# Patient Record
Sex: MALE | Race: WHITE | NOT HISPANIC OR LATINO | Employment: FULL TIME | URBAN - METROPOLITAN AREA
[De-identification: names, ages, dates, MRNs, and addresses within clinical notes are randomized per-mention and may not be internally consistent; named-entity substitution may affect disease eponyms.]

---

## 2019-01-22 ENCOUNTER — OFFICE VISIT (OUTPATIENT)
Dept: GASTROENTEROLOGY | Facility: CLINIC | Age: 64
End: 2019-01-22

## 2019-01-22 ENCOUNTER — LAB (OUTPATIENT)
Dept: LAB | Facility: HOSPITAL | Age: 64
End: 2019-01-22

## 2019-01-22 VITALS — HEART RATE: 65 BPM | SYSTOLIC BLOOD PRESSURE: 160 MMHG | DIASTOLIC BLOOD PRESSURE: 98 MMHG | WEIGHT: 209.2 LBS

## 2019-01-22 DIAGNOSIS — K74.3 PRIMARY BILIARY CIRRHOSIS (HCC): ICD-10-CM

## 2019-01-22 DIAGNOSIS — K74.3 PRIMARY BILIARY CIRRHOSIS (HCC): Primary | ICD-10-CM

## 2019-01-22 DIAGNOSIS — K20.0 ESOPHAGITIS, EOSINOPHILIC: ICD-10-CM

## 2019-01-22 DIAGNOSIS — R31.9 HEMATURIA, UNSPECIFIED TYPE: ICD-10-CM

## 2019-01-22 LAB
ALBUMIN SERPL-MCNC: 4.41 G/DL (ref 3.2–4.8)
ALBUMIN/GLOB SERPL: 1.6 G/DL (ref 1.5–2.5)
ALP SERPL-CCNC: 450 U/L (ref 25–100)
ALT SERPL W P-5'-P-CCNC: 252 U/L (ref 7–40)
AMMONIA BLD-SCNC: 50 UMOL/L (ref 19–60)
ANION GAP SERPL CALCULATED.3IONS-SCNC: 11 MMOL/L (ref 3–11)
AST SERPL-CCNC: 140 U/L (ref 0–33)
BASOPHILS # BLD AUTO: 0.05 10*3/MM3 (ref 0–0.2)
BASOPHILS NFR BLD AUTO: 0.6 % (ref 0–1)
BILIRUB SERPL-MCNC: 5 MG/DL (ref 0.3–1.2)
BUN BLD-MCNC: 17 MG/DL (ref 9–23)
BUN/CREAT SERPL: 13.3 (ref 7–25)
CALCIUM SPEC-SCNC: 9.9 MG/DL (ref 8.7–10.4)
CHLORIDE SERPL-SCNC: 103 MMOL/L (ref 99–109)
CO2 SERPL-SCNC: 27 MMOL/L (ref 20–31)
CREAT BLD-MCNC: 1.28 MG/DL (ref 0.6–1.3)
DEPRECATED RDW RBC AUTO: 46.5 FL (ref 37–54)
EOSINOPHIL # BLD AUTO: 0.79 10*3/MM3 (ref 0–0.3)
EOSINOPHIL NFR BLD AUTO: 10.1 % (ref 0–3)
ERYTHROCYTE [DISTWIDTH] IN BLOOD BY AUTOMATED COUNT: 14.7 % (ref 11.3–14.5)
GFR SERPL CREATININE-BSD FRML MDRD: 56 ML/MIN/1.73
GLOBULIN UR ELPH-MCNC: 2.7 GM/DL
GLUCOSE BLD-MCNC: 87 MG/DL (ref 70–100)
HCT VFR BLD AUTO: 50.8 % (ref 38.9–50.9)
HGB BLD-MCNC: 16.5 G/DL (ref 13.1–17.5)
IMM GRANULOCYTES # BLD AUTO: 0.04 10*3/MM3 (ref 0–0.03)
IMM GRANULOCYTES NFR BLD AUTO: 0.5 % (ref 0–0.6)
INR PPP: 0.9 (ref 0.85–1.16)
LYMPHOCYTES # BLD AUTO: 1.64 10*3/MM3 (ref 0.6–4.8)
LYMPHOCYTES NFR BLD AUTO: 21 % (ref 24–44)
MCH RBC QN AUTO: 28.1 PG (ref 27–31)
MCHC RBC AUTO-ENTMCNC: 32.5 G/DL (ref 32–36)
MCV RBC AUTO: 86.5 FL (ref 80–99)
MONOCYTES # BLD AUTO: 0.74 10*3/MM3 (ref 0–1)
MONOCYTES NFR BLD AUTO: 9.5 % (ref 0–12)
NEUTROPHILS # BLD AUTO: 4.59 10*3/MM3 (ref 1.5–8.3)
NEUTROPHILS NFR BLD AUTO: 58.8 % (ref 41–71)
PLATELET # BLD AUTO: 191 10*3/MM3 (ref 150–450)
PMV BLD AUTO: 13.1 FL (ref 6–12)
POTASSIUM BLD-SCNC: 4.4 MMOL/L (ref 3.5–5.5)
PROT SERPL-MCNC: 7.1 G/DL (ref 5.7–8.2)
PROTHROMBIN TIME: 11.7 SECONDS (ref 11.2–14.3)
RBC # BLD AUTO: 5.87 10*6/MM3 (ref 4.2–5.76)
SODIUM BLD-SCNC: 141 MMOL/L (ref 132–146)
WBC NRBC COR # BLD: 7.81 10*3/MM3 (ref 3.5–10.8)

## 2019-01-22 PROCEDURE — 85025 COMPLETE CBC W/AUTO DIFF WBC: CPT

## 2019-01-22 PROCEDURE — 36415 COLL VENOUS BLD VENIPUNCTURE: CPT

## 2019-01-22 PROCEDURE — 82140 ASSAY OF AMMONIA: CPT

## 2019-01-22 PROCEDURE — 80053 COMPREHEN METABOLIC PANEL: CPT

## 2019-01-22 PROCEDURE — 85610 PROTHROMBIN TIME: CPT

## 2019-01-22 PROCEDURE — 99214 OFFICE O/P EST MOD 30 MIN: CPT | Performed by: INTERNAL MEDICINE

## 2019-01-22 RX ORDER — URSODIOL 500 MG/1
1500 TABLET, FILM COATED ORAL DAILY
Refills: 0 | COMMUNITY
Start: 2019-01-12

## 2019-01-22 RX ORDER — OMEPRAZOLE 40 MG/1
40 CAPSULE, DELAYED RELEASE ORAL DAILY
Refills: 0 | COMMUNITY
Start: 2019-01-12

## 2019-01-22 RX ORDER — LISINOPRIL 20 MG/1
20 TABLET ORAL DAILY
Refills: 0 | COMMUNITY
Start: 2018-12-16

## 2019-01-22 NOTE — PROGRESS NOTES
GASTROENTEROLOGY OFFICE NOTE  David Garrido  [id number]  1955    CARE TEAM  Patient Care Team:  Provider, No Known as PCP - General    No ref. provider found     Chief Complaint   Patient presents with   • Follow-up     Yearly follow up for Primary biliary cirrhosis and also noted that he recently  has had a change in appetite and blood in urine         HISTORY OF PRESENT ILLNESS:  Patient known to me with primary biliary cirrhosis with F2/A0 staging by fibrosure testing.  He is on ursodeoxycholic acid and presents with complaints of office appetite, early satiety as well as recently noticing some hematuria.    He has not had any problems with dark urine, itching, fevers, chills    He denies dysphagia, odynophagia, early satiety, unexplained weight loss, melanotic stools, constipation or diarrhea.    Last colonoscopy was about 2-3 years ago and was reportedly within normal limits.    He also has a history of eosinophilic esophagitis which has required esophageal dilation in past.  This is currently not causing him any problems.  He remains on omeprazole and denies as stated above any dysphagia or retrosternal discomfort or any dyspeptic symptoms.      PAST MEDICAL HISTORY  Past Medical History:   Diagnosis Date   • Eosinophilic esophagitis    • Primary biliary cirrhosis (CMS/HCC)         PAST SURGICAL HISTORY  Last colonoscopy was roughly 2-3 years ago and was reportedly within normal limits.    Past Surgical History:   Procedure Laterality Date   • COLONOSCOPY     • ENDOSCOPY          MEDICATIONS:    Current Outpatient Medications:   •  lisinopril (PRINIVIL,ZESTRIL) 20 MG tablet, , Disp: , Rfl: 0  •  omeprazole (priLOSEC) 40 MG capsule, , Disp: , Rfl: 0  •  PROAIR  (90 Base) MCG/ACT inhaler, , Disp: , Rfl:   •  ursodiol (ACTIGALL) 500 MG tablet, , Disp: , Rfl: 0    ALLERGIES  No Known Allergies    FAMILY HISTORY:  Family History   Problem Relation Age of Onset   • No Known Problems Mother    • No Known  Problems Father        SOCIAL HISTORY  Social History     Socioeconomic History   • Marital status:      Spouse name: Not on file   • Number of children: Not on file   • Years of education: Not on file   • Highest education level: Not on file   Tobacco Use   • Smoking status: Never Smoker   • Smokeless tobacco: Never Used   Substance and Sexual Activity   • Alcohol use: Yes     Comment: rarely   • Drug use: No   • Sexual activity: Defer     Socioeconomic history   with 3 children and 6 grandchildren.         REVIEW OF SYSTEMS  Review of Systems   Constitutional: Negative for unexpected weight loss.   HENT: Negative for trouble swallowing.    Eyes: Negative.    Respiratory: Negative.    Gastrointestinal: Positive for indigestion. Negative for abdominal distention, abdominal pain, anal bleeding, blood in stool, constipation, diarrhea, nausea, rectal pain, vomiting and GERD.   Endocrine: Negative.    Genitourinary: Negative.    Musculoskeletal: Negative.    Skin: Negative.    Allergic/Immunologic: Negative.    Neurological: Negative.    Hematological: Negative.    Psychiatric/Behavioral: Negative.        PHYSICAL EXAM   /98 (BP Location: Right arm, Patient Position: Sitting, Cuff Size: Adult)   Pulse 65   Wt 94.9 kg (209 lb 3.2 oz)   General: Alert and oriented x 3. In no apparent or acute distress.  and No stigmata of chronic liver disease  HEENT: Anicteric slcera. Normal oropharynx  Neck: Supple. Without lymphadenopathy  CV: Regular rate and rhythm, S1, S2  Lungs: Clear to ausculation. Without rales, robchi and wheezing  Abdomen:  Soft,non-distended without palpable masses or hepatosplenomeagaly, areas of rebound tenderness or guarding.   Extremeties: without clubbing, cyanosis or edema  Neurologic:  Alert and oriented x 3 without focal motor or sensory deficits  Rectal exam: deferred     No results found for this or any previous visit.     Results Review:  I reviewed the patient's new clinical  results.      ASSESSMENT  1.-Primary biliary cirrhosis.  Stable.  2.-Early satiety.  Rule out upper GI neoplasia/gastroparesis etc.  3.-Recent change in appetite etiology unclear  4.-Recent hematuria.    PLAN  1.-Schedule EGD to exclude esophageal varices and to evaluate his recent onset early satiety and dyspepsia.  2.-Hepatic elastography  3.-CMP, CBC, prothrombin time, ammonia level  4.-Patient will follow-up with his primary care physician for urology referral regarding hematuria       I discussed the patients findings and my recommendations with patient    Portillo Willard Matos MD  1/22/2019   2:35 PM       ADDENDUM:   1.- CAT scan of 1/25/19 revealed liver with multiple calcified granulomas and a mildly heterogeneous attenuation of the liver with some scalloping of the liver capsule inferiorly.  More importantly cholelithiasis is noted.  Pancreas, adrenal glands and biliary system were normal.  Incidental bilateral renal cystic lesions were noted there is a 7 mm stone in the distal right ureter.  Atherosclerosis of the aorta was noted.  Normal preaortic and left renal vein and IVC were noted.  Duodenal abnormalities.  Appendix and terminal ileum were normal and there is an umbilical hernia noted with a small amount of fat noted.  There is no evidence of pancreatic or biliary ductal dilation noted on the interpretation.    Ultrasound is pending.  We'll consider MRCP to rule out choledocholithiasis if ultrasound is nondiagnostic.    EGD of 1/29/19 was unremarkable and negative specifically for esophageal varices, gastric varices or portal hypertensive gastropathy.     Much of this note is an electronic transcription of spoken language to printed text. Electronic transcription of spoken language may permit erroneous, nonsensical word phrases to be inadvertently transcribed.  Although I have reviewed the note for these errors, some may still be present.

## 2019-01-29 ENCOUNTER — LAB REQUISITION (OUTPATIENT)
Dept: LAB | Facility: HOSPITAL | Age: 64
End: 2019-01-29

## 2019-01-29 ENCOUNTER — OUTSIDE FACILITY SERVICE (OUTPATIENT)
Dept: GASTROENTEROLOGY | Facility: CLINIC | Age: 64
End: 2019-01-29

## 2019-01-29 DIAGNOSIS — K74.3 PRIMARY BILIARY CHOLANGITIS (HCC): ICD-10-CM

## 2019-01-29 PROCEDURE — 43239 EGD BIOPSY SINGLE/MULTIPLE: CPT | Performed by: INTERNAL MEDICINE

## 2019-01-29 PROCEDURE — 88305 TISSUE EXAM BY PATHOLOGIST: CPT | Performed by: INTERNAL MEDICINE

## 2019-01-30 LAB
CYTO UR: NORMAL
LAB AP CASE REPORT: NORMAL
LAB AP CLINICAL INFORMATION: NORMAL
PATH REPORT.FINAL DX SPEC: NORMAL
PATH REPORT.GROSS SPEC: NORMAL

## 2019-02-05 DIAGNOSIS — R11.0 NAUSEA: ICD-10-CM

## 2019-02-05 DIAGNOSIS — R79.89 ELEVATED LFTS: ICD-10-CM

## 2019-02-05 DIAGNOSIS — R79.89 ABNORMAL LFTS: ICD-10-CM

## 2019-02-05 DIAGNOSIS — K74.3 PRIMARY BILIARY CIRRHOSIS (HCC): ICD-10-CM

## 2019-02-05 DIAGNOSIS — R31.9 HEMATURIA, UNSPECIFIED TYPE: Primary | ICD-10-CM

## 2019-02-05 DIAGNOSIS — R63.0 LOSS OF APPETITE: ICD-10-CM

## 2019-02-12 ENCOUNTER — HOSPITAL ENCOUNTER (OUTPATIENT)
Dept: MRI IMAGING | Facility: HOSPITAL | Age: 64
Discharge: HOME | End: 2019-02-12
Admitting: INTERNAL MEDICINE

## 2019-02-12 ENCOUNTER — APPOINTMENT (OUTPATIENT)
Dept: ULTRASOUND IMAGING | Facility: HOSPITAL | Age: 64
End: 2019-02-12
Attending: INTERNAL MEDICINE

## 2019-02-12 DIAGNOSIS — R79.89 ELEVATED LFTS: ICD-10-CM

## 2019-02-12 DIAGNOSIS — R79.89 ABNORMAL LFTS: ICD-10-CM

## 2019-02-12 DIAGNOSIS — R63.0 LOSS OF APPETITE: ICD-10-CM

## 2019-02-12 DIAGNOSIS — R11.0 NAUSEA: ICD-10-CM

## 2019-02-12 DIAGNOSIS — R31.9 HEMATURIA, UNSPECIFIED TYPE: ICD-10-CM

## 2019-02-12 DIAGNOSIS — K74.3 PRIMARY BILIARY CIRRHOSIS (HCC): ICD-10-CM

## 2019-02-12 PROCEDURE — 74181 MRI ABDOMEN W/O CONTRAST: CPT

## 2019-02-19 ENCOUNTER — HOSPITAL ENCOUNTER (OUTPATIENT)
Dept: ULTRASOUND IMAGING | Facility: HOSPITAL | Age: 64
Discharge: HOME | End: 2019-02-19
Attending: INTERNAL MEDICINE | Admitting: INTERNAL MEDICINE

## 2019-02-19 DIAGNOSIS — K74.3 PRIMARY BILIARY CIRRHOSIS (HCC): ICD-10-CM

## 2019-02-19 PROCEDURE — 76981 USE PARENCHYMA: CPT

## 2019-02-19 PROCEDURE — 76705 ECHO EXAM OF ABDOMEN: CPT

## 2019-02-28 ENCOUNTER — OFFICE VISIT (OUTPATIENT)
Dept: GASTROENTEROLOGY | Facility: CLINIC | Age: 64
End: 2019-02-28

## 2019-02-28 ENCOUNTER — LAB (OUTPATIENT)
Dept: LAB | Facility: HOSPITAL | Age: 64
End: 2019-02-28

## 2019-02-28 VITALS — WEIGHT: 205.8 LBS | BODY MASS INDEX: 27.91 KG/M2

## 2019-02-28 DIAGNOSIS — L29.9 PRURITUS: ICD-10-CM

## 2019-02-28 DIAGNOSIS — R74.8 ELEVATED LIVER ENZYMES: ICD-10-CM

## 2019-02-28 DIAGNOSIS — K74.3 PRIMARY BILIARY CIRRHOSIS (HCC): Primary | ICD-10-CM

## 2019-02-28 DIAGNOSIS — K74.3 PRIMARY BILIARY CIRRHOSIS (HCC): ICD-10-CM

## 2019-02-28 DIAGNOSIS — T50.905A DRUG-INDUCED HEPATOTOXICITY: ICD-10-CM

## 2019-02-28 DIAGNOSIS — K71.6 DRUG-INDUCED HEPATOTOXICITY: ICD-10-CM

## 2019-02-28 LAB
ALBUMIN SERPL-MCNC: 3.95 G/DL (ref 3.2–4.8)
ALP SERPL-CCNC: 231 U/L (ref 25–100)
ALT SERPL W P-5'-P-CCNC: 47 U/L (ref 7–40)
AST SERPL-CCNC: 41 U/L (ref 0–33)
BILIRUB CONJ SERPL-MCNC: 1 MG/DL (ref 0–0.2)
BILIRUB INDIRECT SERPL-MCNC: 1.2 MG/DL (ref 0.1–1.1)
BILIRUB SERPL-MCNC: 2.2 MG/DL (ref 0.3–1.2)
INR PPP: 0.97 (ref 0.85–1.16)
PROT SERPL-MCNC: 6.3 G/DL (ref 5.7–8.2)
PROTHROMBIN TIME: 12.4 SECONDS (ref 11.2–14.3)

## 2019-02-28 PROCEDURE — 85610 PROTHROMBIN TIME: CPT

## 2019-02-28 PROCEDURE — 36415 COLL VENOUS BLD VENIPUNCTURE: CPT

## 2019-02-28 PROCEDURE — 99214 OFFICE O/P EST MOD 30 MIN: CPT | Performed by: INTERNAL MEDICINE

## 2019-02-28 PROCEDURE — 80076 HEPATIC FUNCTION PANEL: CPT

## 2019-02-28 RX ORDER — HYDROCODONE BITARTRATE AND ACETAMINOPHEN 7.5; 325 MG/1; MG/1
TABLET ORAL
Refills: 0 | COMMUNITY
Start: 2019-02-26 | End: 2021-08-07

## 2019-03-01 PROBLEM — L29.9 PRURITUS: Status: ACTIVE | Noted: 2019-03-01

## 2019-03-01 PROBLEM — T50.905A DRUG-INDUCED HEPATOTOXICITY: Status: ACTIVE | Noted: 2019-03-01

## 2019-03-01 PROBLEM — R74.8 ELEVATED LIVER ENZYMES: Status: ACTIVE | Noted: 2019-03-01

## 2019-03-01 PROBLEM — K71.6 DRUG-INDUCED HEPATOTOXICITY: Status: ACTIVE | Noted: 2019-03-01

## 2019-04-23 ENCOUNTER — OFFICE VISIT (OUTPATIENT)
Dept: GASTROENTEROLOGY | Facility: CLINIC | Age: 64
End: 2019-04-23

## 2019-04-23 VITALS
DIASTOLIC BLOOD PRESSURE: 84 MMHG | WEIGHT: 205.8 LBS | BODY MASS INDEX: 27.87 KG/M2 | SYSTOLIC BLOOD PRESSURE: 169 MMHG | HEIGHT: 72 IN | HEART RATE: 53 BPM

## 2019-04-23 DIAGNOSIS — Z86.010 HISTORY OF ADENOMATOUS POLYP OF COLON: ICD-10-CM

## 2019-04-23 DIAGNOSIS — K20.0 ESOPHAGITIS, EOSINOPHILIC: ICD-10-CM

## 2019-04-23 DIAGNOSIS — R74.8 ELEVATED LIVER ENZYMES: ICD-10-CM

## 2019-04-23 DIAGNOSIS — K74.3 PRIMARY BILIARY CIRRHOSIS (HCC): Primary | ICD-10-CM

## 2019-04-23 PROCEDURE — 99213 OFFICE O/P EST LOW 20 MIN: CPT | Performed by: INTERNAL MEDICINE

## 2019-04-23 RX ORDER — LEVOFLOXACIN 500 MG/1
TABLET, FILM COATED ORAL
Refills: 0 | COMMUNITY
Start: 2019-04-19 | End: 2019-04-24

## 2019-04-23 NOTE — PROGRESS NOTES
GASTROENTEROLOGY OFFICE NOTE  David Garrido Jr.  [id number]  1955    CARE TEAM  Patient Care Team:  Linden Munoz MD as PCP - General (Family Medicine)    No ref. provider found     Chief Complaint   Patient presents with   • Follow-up     Primary Biliary Cirrhosis        HISTORY OF PRESENT ILLNESS:  Patient for follow-up of primary biliary cirrhosis with recent elevation of serum liver enzymes presumed to be due to hepatotoxicity from Levaquin.    Since I last saw him he states he was in the emergency department with what was felt to be some type of skin infection and he was prescribed an antibiotic and he cannot recall if he was prescribed Levaquin specifically.  He may very well have been.  He has emergency department papers that indicate Levaquin as a medication list but it is unclear if this is simply a restatement of his previous meds or new meds that have been recently prescribed.  Cephalexin was also noted.    His most recent serum liver enzymes are from 4/16/2019 at which time his AST was 30, ALT was 28, alkaline phosphatase 195 and total bilirubin 1.1.  These are all marked improvement from previous labs.    His pruritus has resolved and he denies any new localizing GI complaints specifically denies dysphagia, odynophagia, early satiety or excellent weight loss.    He remains asymptomatic from his eosinophilic esophagitis without any swallowing or any chest pain/dyspeptic type symptoms.    PAST MEDICAL HISTORY  Past Medical History:   Diagnosis Date   • Colon polyp    • Eosinophilic esophagitis    • Primary biliary cirrhosis (CMS/HCC)         PAST SURGICAL HISTORY  Past Surgical History:   Procedure Laterality Date   • COLONOSCOPY     • CYSTOSCOPY INSERTION / REMOVAL STENT / STONE     • ENDOSCOPY          MEDICATIONS:    Current Outpatient Medications:   •  HYDROcodone-acetaminophen (NORCO) 7.5-325 MG per tablet, take 1 tablet by mouth every 6 hours if needed for pain, Disp: , Rfl: 0  •   "levoFLOXacin (LEVAQUIN) 500 MG tablet, take 1 tablet by mouth every 24 hours for 14 days, Disp: , Rfl: 0  •  lisinopril (PRINIVIL,ZESTRIL) 20 MG tablet, , Disp: , Rfl: 0  •  Obeticholic Acid (OCALIVA) 5 MG tablet, Take 5 mg by mouth Daily., Disp: 30 tablet, Rfl: 6  •  omeprazole (priLOSEC) 40 MG capsule, , Disp: , Rfl: 0  •  PROAIR  (90 Base) MCG/ACT inhaler, , Disp: , Rfl:   •  ursodiol (ACTIGALL) 500 MG tablet, , Disp: , Rfl: 0    ALLERGIES  No Known Allergies    FAMILY HISTORY:  Family History   Problem Relation Age of Onset   • No Known Problems Mother    • No Known Problems Father        SOCIAL HISTORY  Social History     Socioeconomic History   • Marital status:      Spouse name: Not on file   • Number of children: Not on file   • Years of education: Not on file   • Highest education level: Not on file   Tobacco Use   • Smoking status: Never Smoker   • Smokeless tobacco: Never Used   Substance and Sexual Activity   • Alcohol use: Yes     Comment: rarely   • Drug use: Yes     Types: Marijuana   • Sexual activity: Defer     Socioeconomic History:  He is  and lives at home with his wife.  He is a non-smoker.  Rarely drinks alcoholic beverages.        REVIEW OF SYSTEMS  Review of Systems   Constitutional: Negative for unexpected weight loss.   HENT: Negative for trouble swallowing.    Eyes: Negative.    Respiratory: Negative.    Gastrointestinal: Negative for abdominal distention, abdominal pain, anal bleeding, blood in stool, constipation, diarrhea, nausea, rectal pain, vomiting, GERD and indigestion.   Endocrine: Negative.    Genitourinary: Negative.    Musculoskeletal: Negative.    Skin: Negative.    Allergic/Immunologic: Negative.    Neurological: Negative.    Hematological: Negative.    Psychiatric/Behavioral: Negative.      Above-noted ROS reviewed by me.    PHYSICAL EXAM   /84 (BP Location: Right arm, Patient Position: Sitting, Cuff Size: Adult)   Pulse 53   Ht 182.9 cm (72\")   " Wt 93.4 kg (205 lb 12.8 oz)   BMI 27.91 kg/m²   General: Alert and oriented x 3. In no apparent or acute distress.  and No stigmata of chronic liver disease  HEENT: Anicteric slcera. Normal oropharynx  Neck: Supple. Without lymphadenopathy  CV: Regular rate and rhythm, S1, S2  Lungs: Clear to ausculation. Without rales, robchi and wheezing  Abdomen:  Soft,non-distended without palpable masses or hepatosplenomeagaly, areas of rebound tenderness or guarding.   Extremeties: without clubbing, cyanosis or edema  Neurologic:  Alert and oriented x 3 without focal motor or sensory deficits  Rectal exam: deferred     Results for orders placed or performed in visit on 02/28/19   Protime-INR   Result Value Ref Range    Protime 12.4 11.2 - 14.3 Seconds    INR 0.97 0.85 - 1.16   Hepatic Function Panel   Result Value Ref Range    Total Protein 6.3 5.7 - 8.2 g/dL    Albumin 3.95 3.20 - 4.80 g/dL    ALT (SGPT) 47 (H) 7 - 40 U/L    AST (SGOT) 41 (H) 0 - 33 U/L    Alkaline Phosphatase 231 (H) 25 - 100 U/L    Total Bilirubin 2.2 (H) 0.3 - 1.2 mg/dL    Bilirubin, Direct 1.0 (H) 0.0 - 0.2 mg/dL    Bilirubin, Indirect 1.2 (H) 0.1 - 1.1 mg/dL   Current labs from 4/16/2019 are as follows: Total bilirubin 1.1, alkaline phosphatase 195 units/L, AST 30 units/L, ALT 28 units/L    Results Review:  I reviewed the patient's new clinical results.      ASSESSMENT  1.-Primary biliary cirrhosis  2.-Status post recent elevation of liver enzymes presumed due to Levaquin hepatotoxicity  3.-History of adenomatous colonic polyps  4.-Eosinophilic esophagitis.    PLAN  1.-Continue proton pump inhibitors  2.-Continue Ly  3.-Monitor serum liver enzymes  4.-Patient will call us to confirm whether he is on Levaquin or not.      I discussed the patients findings and my recommendations with patient    Portillo Matos MD  4/23/2019   2:35 PM    Much of this note is an electronic transcription of spoken language to printed text. Electronic  transcription of spoken language may permit erroneous, nonsensical word phrases to be inadvertently transcribed.  Although I have reviewed the note for these errors, some may still be present.

## 2019-04-24 PROBLEM — Z86.0101 HISTORY OF ADENOMATOUS POLYP OF COLON: Status: ACTIVE | Noted: 2019-04-24

## 2019-04-24 PROBLEM — Z86.010 HISTORY OF ADENOMATOUS POLYP OF COLON: Status: ACTIVE | Noted: 2019-04-24

## 2019-06-07 ENCOUNTER — TELEPHONE (OUTPATIENT)
Dept: GASTROENTEROLOGY | Facility: CLINIC | Age: 64
End: 2019-06-07

## 2019-10-22 ENCOUNTER — LAB (OUTPATIENT)
Dept: LAB | Facility: HOSPITAL | Age: 64
End: 2019-10-22

## 2019-10-22 ENCOUNTER — OFFICE VISIT (OUTPATIENT)
Dept: GASTROENTEROLOGY | Facility: CLINIC | Age: 64
End: 2019-10-22

## 2019-10-22 VITALS
SYSTOLIC BLOOD PRESSURE: 151 MMHG | HEART RATE: 63 BPM | BODY MASS INDEX: 27.9 KG/M2 | HEIGHT: 72 IN | WEIGHT: 206 LBS | DIASTOLIC BLOOD PRESSURE: 85 MMHG

## 2019-10-22 DIAGNOSIS — Z86.010 HISTORY OF ADENOMATOUS POLYP OF COLON: ICD-10-CM

## 2019-10-22 DIAGNOSIS — K74.3 PRIMARY BILIARY CIRRHOSIS (HCC): ICD-10-CM

## 2019-10-22 DIAGNOSIS — K71.6 DRUG-INDUCED HEPATOTOXICITY: ICD-10-CM

## 2019-10-22 DIAGNOSIS — T50.905A DRUG-INDUCED HEPATOTOXICITY: ICD-10-CM

## 2019-10-22 DIAGNOSIS — K20.0 ESOPHAGITIS, EOSINOPHILIC: ICD-10-CM

## 2019-10-22 DIAGNOSIS — R74.8 ELEVATED LIVER ENZYMES: ICD-10-CM

## 2019-10-22 DIAGNOSIS — K74.3 PRIMARY BILIARY CIRRHOSIS (HCC): Primary | ICD-10-CM

## 2019-10-22 LAB
ALBUMIN SERPL-MCNC: 3.9 G/DL (ref 3.5–5.2)
ALBUMIN/GLOB SERPL: 1.1 G/DL
ALP SERPL-CCNC: 178 U/L (ref 39–117)
ALT SERPL W P-5'-P-CCNC: 26 U/L (ref 1–41)
AMMONIA BLD-SCNC: 44 UMOL/L (ref 16–60)
ANION GAP SERPL CALCULATED.3IONS-SCNC: 10.5 MMOL/L (ref 5–15)
AST SERPL-CCNC: 25 U/L (ref 1–40)
BILIRUB SERPL-MCNC: 1.5 MG/DL (ref 0.2–1.2)
BUN BLD-MCNC: 18 MG/DL (ref 8–23)
BUN/CREAT SERPL: 15.1 (ref 7–25)
CALCIUM SPEC-SCNC: 9.2 MG/DL (ref 8.6–10.5)
CHLORIDE SERPL-SCNC: 101 MMOL/L (ref 98–107)
CO2 SERPL-SCNC: 26.5 MMOL/L (ref 22–29)
CREAT BLD-MCNC: 1.19 MG/DL (ref 0.76–1.27)
GFR SERPL CREATININE-BSD FRML MDRD: 61 ML/MIN/1.73
GLOBULIN UR ELPH-MCNC: 3.5 GM/DL
GLUCOSE BLD-MCNC: 95 MG/DL (ref 65–99)
INR PPP: 0.98 (ref 0.85–1.16)
POTASSIUM BLD-SCNC: 4.1 MMOL/L (ref 3.5–5.2)
PROT SERPL-MCNC: 7.4 G/DL (ref 6–8.5)
PROTHROMBIN TIME: 12.5 SECONDS (ref 11.2–14.3)
SODIUM BLD-SCNC: 138 MMOL/L (ref 136–145)

## 2019-10-22 PROCEDURE — 82140 ASSAY OF AMMONIA: CPT

## 2019-10-22 PROCEDURE — 80053 COMPREHEN METABOLIC PANEL: CPT

## 2019-10-22 PROCEDURE — 36415 COLL VENOUS BLD VENIPUNCTURE: CPT

## 2019-10-22 PROCEDURE — 99214 OFFICE O/P EST MOD 30 MIN: CPT | Performed by: INTERNAL MEDICINE

## 2019-10-22 PROCEDURE — 85610 PROTHROMBIN TIME: CPT

## 2019-10-22 NOTE — PROGRESS NOTES
"GASTROENTEROLOGY OFFICE NOTE  David Garrido Jr.  [id number]  1955    CARE TEAM  Patient Care Team:  Linden Munoz MD as PCP - General (Family Medicine)    No ref. provider found     Chief Complaint   Patient presents with   • Follow-up     Primary biliary cirrhosis         HISTORY OF PRESENT ILLNESS:  Patient presents for follow-up of primary biliary cirrhosis, recent drug-induced hepatotoxicity and eosinophilic esophagitis.    In terms of his eosinophilic esophagitis he is here today without reflux symptoms dysphagia to solids or dyspeptic symptoms denying odynophagia, early satiety or unexplained weight loss.    In terms of his primary biliary cirrhosis he remains on ursodeoxycholic acid and Ocaliva.  His most recent serum liver enzymes remained mildly elevated.  He denies itching, acholic stools, jaundice, dark urine.    He is noted a little bit of coughing I should note.    He is status post recent Levaquin related to drug-induced liver injury.    Incidentally had a CAT scan done of his chest and he had a \"clogged artery\" but does not have any further details in that regard and apparently will be seeing cardiology as an elective outpatient visit to further address this abnormality.    PAST MEDICAL HISTORY  Past Medical History:   Diagnosis Date   • Colon polyp    • Eosinophilic esophagitis    • Primary biliary cirrhosis (CMS/HCC)         PAST SURGICAL HISTORY  Past Surgical History:   Procedure Laterality Date   • COLONOSCOPY     • CYSTOSCOPY INSERTION / REMOVAL STENT / STONE     • ENDOSCOPY          MEDICATIONS:    Current Outpatient Medications:   •  lisinopril (PRINIVIL,ZESTRIL) 20 MG tablet, , Disp: , Rfl: 0  •  omeprazole (priLOSEC) 40 MG capsule, , Disp: , Rfl: 0  •  ursodiol (ACTIGALL) 500 MG tablet, , Disp: , Rfl: 0  •  HYDROcodone-acetaminophen (NORCO) 7.5-325 MG per tablet, take 1 tablet by mouth every 6 hours if needed for pain, Disp: , Rfl: 0  •  Obeticholic Acid (OCALIVA) 5 MG " tablet, Take 5 mg by mouth Daily., Disp: 30 tablet, Rfl: 6  •  PROAIR  (90 Base) MCG/ACT inhaler, , Disp: , Rfl:     ALLERGIES  Allergies   Allergen Reactions   • Levaquin [Levofloxacin] Other (See Comments)     Elevated liver enzymes, jaundice , intrahepatic cholestasis       FAMILY HISTORY:  Family History   Problem Relation Age of Onset   • No Known Problems Mother    • No Known Problems Father    • Colon polyps Neg Hx    • Colon cancer Neg Hx        SOCIAL HISTORY  Social History     Socioeconomic History   • Marital status:      Spouse name: Not on file   • Number of children: Not on file   • Years of education: Not on file   • Highest education level: Not on file   Tobacco Use   • Smoking status: Never Smoker   • Smokeless tobacco: Never Used   Substance and Sexual Activity   • Alcohol use: Yes     Frequency: Never     Comment: rarely   • Drug use: Yes     Types: Marijuana   • Sexual activity: Yes     Socioeconomic History: He is  and lives at home with his wife.  He is a non-smoker.  Rarely drinks alcoholic beverages.        REVIEW OF SYSTEMS  Review of Systems   Constitutional: Positive for fatigue. Negative for activity change, appetite change, chills, diaphoresis, fever, unexpected weight gain and unexpected weight loss.   HENT: Positive for dental problem. Negative for congestion, drooling, ear discharge, ear pain, facial swelling, hearing loss, mouth sores, nosebleeds, postnasal drip, rhinorrhea, sinus pressure, sneezing, sore throat, swollen glands, tinnitus, trouble swallowing and voice change.    Respiratory: Positive for shortness of breath. Negative for apnea, cough, choking, chest tightness, wheezing and stridor.    Cardiovascular: Negative for chest pain, palpitations and leg swelling.   Gastrointestinal: Negative for abdominal distention, abdominal pain, anal bleeding, blood in stool, constipation, diarrhea, nausea, rectal pain, vomiting, GERD and indigestion.   Endocrine:  "Negative for cold intolerance, heat intolerance, polydipsia, polyphagia and polyuria.   Musculoskeletal: Negative for arthralgias, back pain, gait problem, joint swelling, myalgias, neck pain, neck stiffness and bursitis.   Allergic/Immunologic: Negative for environmental allergies, food allergies and immunocompromised state.   Neurological: Positive for dizziness. Negative for tremors, seizures, syncope, facial asymmetry, speech difficulty, weakness, light-headedness, numbness, headache, memory problem and confusion.   Hematological: Negative for adenopathy. Does not bruise/bleed easily.   Psychiatric/Behavioral: Positive for sleep disturbance. Negative for agitation, behavioral problems, decreased concentration, dysphoric mood, hallucinations, self-injury, suicidal ideas, negative for hyperactivity, depressed mood and stress. The patient is not nervous/anxious.      I have reviewed the above noted review of systems.    PHYSICAL EXAM   /85 (BP Location: Right arm, Patient Position: Sitting, Cuff Size: Adult)   Pulse 63   Ht 182.9 cm (72\")   Wt 93.4 kg (206 lb)   BMI 27.94 kg/m²   General: Alert and oriented x 3. In no apparent or acute distress.  and No stigmata of chronic liver disease  HEENT: Anicteric slcera. Normal oropharynx  Neck: Supple. Without lymphadenopathy  CV: Regular rate and rhythm, S1, S2  Lungs: Clear to ausculation. Without rales, robchi and wheezing  Abdomen:  Soft,non-distended without palpable masses or hepatosplenomeagaly, areas of rebound tenderness or guarding.   Extremeties: without clubbing, cyanosis or edema  Neurologic:  Alert and oriented x 3 without focal motor or sensory deficits  Rectal exam: deferred     Results for orders placed or performed in visit on 02/28/19   Protime-INR   Result Value Ref Range    Protime 12.4 11.2 - 14.3 Seconds    INR 0.97 0.85 - 1.16   Hepatic Function Panel   Result Value Ref Range    Total Protein 6.3 5.7 - 8.2 g/dL    Albumin 3.95 3.20 - 4.80 " g/dL    ALT (SGPT) 47 (H) 7 - 40 U/L    AST (SGOT) 41 (H) 0 - 33 U/L    Alkaline Phosphatase 231 (H) 25 - 100 U/L    Total Bilirubin 2.2 (H) 0.3 - 1.2 mg/dL    Bilirubin, Direct 1.0 (H) 0.0 - 0.2 mg/dL    Bilirubin, Indirect 1.2 (H) 0.1 - 1.1 mg/dL        Results Review:  I reviewed the patient's new clinical results.      ASSESSMENT  1.- PBC.  Stable on ursodeoxycholic acid and obeticholic acid  2.- Elevated LFT's.Multifactorial .   3.- Resolving drug-induced hepatotoxicity  4.- Eosinophilic esophagitis  5.- Abnormal CT. Data deficiti.     PLAN  1.- Reckeck serum liver enzymes  2.- Continue Ocaliva/pedro  3.- RTC 3 months  4.- Continue PPI (omeprazole)  5.- Obtain CT report for review      I discussed the patients findings and my recommendations with patient    Portillo Willard Matos MD  10/22/2019   1:48 PM    Much of this note is an electronic transcription of spoken language to printed text. Electronic transcription of spoken language may permit erroneous, nonsensical word phrases to be inadvertently transcribed.  Although I have reviewed the note for these errors, some may still be present.

## 2019-10-23 NOTE — PROGRESS NOTES
Lucila, let patient know results. Let me know if patient has any concerns.LFT's improving. Continue to monitor mitzi

## 2019-10-24 DIAGNOSIS — K74.3 PRIMARY BILIARY CIRRHOSIS (HCC): Primary | ICD-10-CM

## 2019-10-24 DIAGNOSIS — R79.89 ELEVATED LFTS: ICD-10-CM

## 2019-10-24 DIAGNOSIS — R79.89 ABNORMAL LFTS: ICD-10-CM

## 2019-12-04 DIAGNOSIS — R79.89 ELEVATED LFTS: ICD-10-CM

## 2019-12-04 DIAGNOSIS — K74.3 PRIMARY BILIARY CIRRHOSIS (HCC): Primary | ICD-10-CM

## 2020-01-17 ENCOUNTER — TELEPHONE (OUTPATIENT)
Dept: GASTROENTEROLOGY | Facility: CLINIC | Age: 65
End: 2020-01-17

## 2020-01-17 NOTE — TELEPHONE ENCOUNTER
Called and notified patient about his labs from December and apologized that we were just no calling him. The patient keanu a follow up in December where  was suppose to go over the labs if they were resulted in time. Patient had to cancel that appointment because he had open heart surgery but rescheduled his follow up for   02/04/2020.    Notified patient that his AST and ALT are both normal but the alkaline phosphatase is still elevated as it has been in the past and has gone up since his last labs. Patient confirmed he understood and will talk to Dr. Matos about why his Alkaline phosphatase continues to be elevated during his follow up.

## 2020-02-04 ENCOUNTER — LAB (OUTPATIENT)
Dept: LAB | Facility: HOSPITAL | Age: 65
End: 2020-02-04

## 2020-02-04 ENCOUNTER — OFFICE VISIT (OUTPATIENT)
Dept: GASTROENTEROLOGY | Facility: CLINIC | Age: 65
End: 2020-02-04

## 2020-02-04 VITALS
WEIGHT: 207.4 LBS | HEART RATE: 66 BPM | HEIGHT: 72 IN | DIASTOLIC BLOOD PRESSURE: 71 MMHG | BODY MASS INDEX: 28.09 KG/M2 | SYSTOLIC BLOOD PRESSURE: 128 MMHG

## 2020-02-04 DIAGNOSIS — K74.3 PRIMARY BILIARY CIRRHOSIS (HCC): Primary | ICD-10-CM

## 2020-02-04 DIAGNOSIS — K74.3 PRIMARY BILIARY CIRRHOSIS (HCC): ICD-10-CM

## 2020-02-04 LAB
ALBUMIN SERPL-MCNC: 4 G/DL (ref 3.5–5.2)
ALBUMIN/GLOB SERPL: 1.3 G/DL
ALP SERPL-CCNC: 237 U/L (ref 39–117)
ALT SERPL W P-5'-P-CCNC: 32 U/L (ref 1–41)
ANION GAP SERPL CALCULATED.3IONS-SCNC: 14.9 MMOL/L (ref 5–15)
AST SERPL-CCNC: 31 U/L (ref 1–40)
BILIRUB SERPL-MCNC: 0.8 MG/DL (ref 0.2–1.2)
BUN BLD-MCNC: 19 MG/DL (ref 8–23)
BUN/CREAT SERPL: 13.8 (ref 7–25)
CALCIUM SPEC-SCNC: 9.3 MG/DL (ref 8.6–10.5)
CHLORIDE SERPL-SCNC: 103 MMOL/L (ref 98–107)
CO2 SERPL-SCNC: 25.1 MMOL/L (ref 22–29)
CREAT BLD-MCNC: 1.38 MG/DL (ref 0.76–1.27)
GFR SERPL CREATININE-BSD FRML MDRD: 52 ML/MIN/1.73
GLOBULIN UR ELPH-MCNC: 3.2 GM/DL
GLUCOSE BLD-MCNC: 61 MG/DL (ref 65–99)
INR PPP: 1 (ref 0.85–1.16)
POTASSIUM BLD-SCNC: 4.2 MMOL/L (ref 3.5–5.2)
PROT SERPL-MCNC: 7.2 G/DL (ref 6–8.5)
PROTHROMBIN TIME: 12.7 SECONDS (ref 11.2–14.3)
SODIUM BLD-SCNC: 143 MMOL/L (ref 136–145)

## 2020-02-04 PROCEDURE — 80053 COMPREHEN METABOLIC PANEL: CPT

## 2020-02-04 PROCEDURE — 99213 OFFICE O/P EST LOW 20 MIN: CPT | Performed by: INTERNAL MEDICINE

## 2020-02-04 PROCEDURE — 85610 PROTHROMBIN TIME: CPT

## 2020-02-04 PROCEDURE — 36415 COLL VENOUS BLD VENIPUNCTURE: CPT

## 2020-02-04 RX ORDER — METOPROLOL SUCCINATE 25 MG/1
25 TABLET, EXTENDED RELEASE ORAL DAILY
COMMUNITY
Start: 2019-11-21

## 2020-02-04 RX ORDER — ATORVASTATIN CALCIUM 40 MG/1
40 TABLET, FILM COATED ORAL DAILY
COMMUNITY
Start: 2020-01-23

## 2020-02-04 RX ORDER — CLOPIDOGREL BISULFATE 75 MG/1
75 TABLET ORAL DAILY
COMMUNITY
Start: 2020-01-24

## 2020-02-04 RX ORDER — ISOSORBIDE MONONITRATE 30 MG/1
30 TABLET, EXTENDED RELEASE ORAL DAILY
COMMUNITY
Start: 2019-11-21

## 2020-02-04 NOTE — PROGRESS NOTES
GASTROENTEROLOGY OFFICE NOTE  David Garrido Jr.  [id number]  1955    CARE TEAM  Patient Care Team:  Linden Munoz MD as PCP - General (Family Medicine)    No ref. provider found     Chief Complaint   Patient presents with   • Follow-up     Primary Biliary Cirrhosis        HISTORY OF PRESENT ILLNESS:  Patient presents for follow-up of primary biliary cirrhosis and recent drug-induced liver injury which resolved (Levaquin).    Since I last saw me underwent a stress test because of shortness of breath.  Apparently the stress test was negative and he subsequently underwent cardiac catheterization.  These findings led to a coronary artery bypass grafting surgery with one-vessel apparently the LAD.  Subsequent to his surgery in [place] done by Dr. Ken, he underwent coronary stent placement and is currently on long-term anticoagulants with clopidogrel.    He states he has more energy than he has in a long time is feeling very well and he recovered very well from surgery stating he went back to work 3 weeks postoperatively.    His recent liver enzymes reveal persistently elevated alkaline phosphatase but within his usual range and no elevation of AST, ALT or total bilirubin.    PAST MEDICAL HISTORY  Past Medical History:   Diagnosis Date   • Colon polyp    • Eosinophilic esophagitis    • Primary biliary cirrhosis (CMS/HCC)         PAST SURGICAL HISTORY  Past Surgical History:   Procedure Laterality Date   • CARDIAC SURGERY     • COLONOSCOPY     • CYSTOSCOPY INSERTION / REMOVAL STENT / STONE     • ENDOSCOPY          MEDICATIONS:    Current Outpatient Medications:   •  atorvastatin (LIPITOR) 40 MG tablet, , Disp: , Rfl:   •  clopidogrel (PLAVIX) 75 MG tablet, , Disp: , Rfl:   •  isosorbide mononitrate (IMDUR) 30 MG 24 hr tablet, , Disp: , Rfl:   •  metoprolol succinate XL (TOPROL-XL) 25 MG 24 hr tablet, , Disp: , Rfl:   •  omeprazole (priLOSEC) 40 MG capsule, , Disp: , Rfl: 0  •  ursodiol (ACTIGALL)  500 MG tablet, , Disp: , Rfl: 0  •  HYDROcodone-acetaminophen (NORCO) 7.5-325 MG per tablet, take 1 tablet by mouth every 6 hours if needed for pain, Disp: , Rfl: 0  •  lisinopril (PRINIVIL,ZESTRIL) 20 MG tablet, , Disp: , Rfl: 0  •  Obeticholic Acid (OCALIVA) 5 MG tablet, Take 5 mg by mouth Daily., Disp: 30 tablet, Rfl: 6  •  PROAIR  (90 Base) MCG/ACT inhaler, , Disp: , Rfl:     ALLERGIES  Allergies   Allergen Reactions   • Levaquin [Levofloxacin] Other (See Comments)     Elevated liver enzymes, jaundice , intrahepatic cholestasis       FAMILY HISTORY:  Family History   Problem Relation Age of Onset   • No Known Problems Mother    • No Known Problems Father    • Colon polyps Neg Hx    • Colon cancer Neg Hx        SOCIAL HISTORY  Social History     Socioeconomic History   • Marital status:      Spouse name: Not on file   • Number of children: Not on file   • Years of education: Not on file   • Highest education level: Not on file   Tobacco Use   • Smoking status: Never Smoker   • Smokeless tobacco: Never Used   Substance and Sexual Activity   • Alcohol use: Yes     Frequency: Never     Comment: rarely   • Drug use: Yes     Types: Marijuana   • Sexual activity: Yes     Socioeconomic History:  .  Lives at home with his wife.  Non-smoker/rarely drinks alcoholic beverages.       REVIEW OF SYSTEMS  Review of Systems   Constitutional: Negative for activity change, appetite change, chills, diaphoresis, fatigue, fever, unexpected weight gain and unexpected weight loss.   HENT: Negative for trouble swallowing and voice change.    Gastrointestinal: Negative for abdominal distention, abdominal pain, anal bleeding, blood in stool, constipation, diarrhea, nausea, rectal pain, vomiting, GERD and indigestion.     I reviewed the above-noted review of systems.    PHYSICAL EXAM   There were no vitals taken for this visit.  General: Alert and oriented x 3. In no apparent or acute distress.  and No stigmata of  chronic liver disease  HEENT: Anicteric slcera. Normal oropharynx  Neck: Supple. Without lymphadenopathy  CV: Regular rate and rhythm, S1, S2  Lungs: Clear to ausculation. Without rales, robchi and wheezing  Abdomen:  Soft,non-distended without palpable masses or hepatosplenomeagaly, areas of rebound tenderness or guarding.   Extremeties: without clubbing, cyanosis or edema  Neurologic:  Alert and oriented x 3 without focal motor or sensory deficits  Rectal exam: deferred        Results Review:  I reviewed the patient's new clinical results.  I have his results from January 7, 2020.  Total bilirubin was 1.1  Alkaline phosphatase 308 units/L  AST 21 units/L  ALT 21 units/L  Total protein 6.6 g/dL/albumin 3.7 g/dL  Prothrombin time INR was within normal limits 1.0      ASSESSMENT  1.-  Primary biliary cirrhosis without evidence of decompensation  2.-  Resolved Levaquin induced hepatotoxicity  3.-  Coronary artery disease status post recent CABG and coronary stent placement  4.-  Iatrogenic coagulopathy  5.-  No evidence of esophageal varices on most recent EGD of January 2019    PLAN  1.-  Continue to monitor liver enzymes.  Recheck today and again in 6 months I will see him back in 1 year  2.-  Balance EGD 1 year from now for screening (for esophageal varices  3.-  Continue ursodeoxycholic acid and obeticholic acid  4.-  Follow-up otherwise as needed.      I discussed the patients findings and my recommendations with patient    Portillo Matos MD  2/4/2020   3:02 PM    Much of this note is an electronic transcription of spoken language to printed text. Electronic transcription of spoken language may permit erroneous, nonsensical word phrases to be inadvertently transcribed.  Although I have reviewed the note for these errors, some may still be present.

## 2020-02-05 DIAGNOSIS — K74.3 PRIMARY BILIARY CIRRHOSIS (HCC): Primary | ICD-10-CM

## 2020-02-05 DIAGNOSIS — R79.89 ELEVATED LFTS: ICD-10-CM

## 2020-02-05 DIAGNOSIS — R74.8 ELEVATED LIVER ENZYMES: ICD-10-CM

## 2020-02-05 NOTE — PROGRESS NOTES
Please call the patient regarding his abnormal result. Since Alk phos went up I'd like to check monthly x 3.

## 2021-01-21 ENCOUNTER — OFFICE VISIT (OUTPATIENT)
Dept: GASTROENTEROLOGY | Facility: CLINIC | Age: 66
End: 2021-01-21

## 2021-01-21 VITALS
SYSTOLIC BLOOD PRESSURE: 126 MMHG | WEIGHT: 209.4 LBS | HEART RATE: 54 BPM | DIASTOLIC BLOOD PRESSURE: 58 MMHG | TEMPERATURE: 97.5 F | BODY MASS INDEX: 28.4 KG/M2

## 2021-01-21 DIAGNOSIS — K74.3 PRIMARY BILIARY CIRRHOSIS (HCC): Primary | ICD-10-CM

## 2021-01-21 DIAGNOSIS — K71.6 DRUG-INDUCED HEPATOTOXICITY: ICD-10-CM

## 2021-01-21 DIAGNOSIS — R74.8 ELEVATED LIVER ENZYMES: ICD-10-CM

## 2021-01-21 DIAGNOSIS — K20.0 ESOPHAGITIS, EOSINOPHILIC: ICD-10-CM

## 2021-01-21 DIAGNOSIS — T50.905A DRUG-INDUCED HEPATOTOXICITY: ICD-10-CM

## 2021-01-21 DIAGNOSIS — Z86.010 HISTORY OF ADENOMATOUS POLYP OF COLON: ICD-10-CM

## 2021-01-21 PROCEDURE — 99214 OFFICE O/P EST MOD 30 MIN: CPT | Performed by: INTERNAL MEDICINE

## 2021-01-21 NOTE — PROGRESS NOTES
GASTROENTEROLOGY OFFICE NOTE  David Garrido Jr.  [id number]  1955       CARE TEAM  Patient Care Team:  Linden Munoz MD as PCP - General (Family Medicine)    No ref. provider found     Chief Complaint   Patient presents with   • Follow-up     Primary Biliary cirrhosis         HISTORY OF PRESENT ILLNESS:  Patient presents for routine follow-up of his primary bili cirrhosis with recent liver enzymes largely unremarkable.  He is currently on obeticholic acid and ursodeoxycholic acid.    Labs from December 1, 2020 revealed total bilirubin of 1.3, alkaline phosphatase of 183 units/L and AST and ALT normal at 27 units and 24 units/L.    He is not having any problems with dysphagia, odynophagia, early satiety or unexplained weight loss.    He states he was Covid positive and was mildly symptomatic 3 weeks ago with some fatigue but is recovering and feeling well without any respiratory complaints.        PAST MEDICAL HISTORY  Past Medical History:   Diagnosis Date   • Colon polyp    • Eosinophilic esophagitis    • Primary biliary cirrhosis (CMS/HCC)         PAST SURGICAL HISTORY  Past Surgical History:   Procedure Laterality Date   • CARDIAC SURGERY     • COLONOSCOPY     • CYSTOSCOPY INSERTION / REMOVAL STENT / STONE     • ENDOSCOPY          MEDICATIONS:    Current Outpatient Medications:   •  atorvastatin (LIPITOR) 40 MG tablet, Take 40 mg by mouth Daily., Disp: , Rfl:   •  clopidogrel (PLAVIX) 75 MG tablet, Take 75 mg by mouth Daily., Disp: , Rfl:   •  isosorbide mononitrate (IMDUR) 30 MG 24 hr tablet, Take 30 mg by mouth Daily., Disp: , Rfl:   •  lisinopril (PRINIVIL,ZESTRIL) 20 MG tablet, Take 20 mg by mouth Daily., Disp: , Rfl: 0  •  metoprolol succinate XL (TOPROL-XL) 25 MG 24 hr tablet, Take 25 mg by mouth Daily., Disp: , Rfl:   •  omeprazole (priLOSEC) 40 MG capsule, Take 40 mg by mouth Daily., Disp: , Rfl: 0  •  ursodiol (ACTIGALL) 500 MG tablet, Take 1,500 mg by mouth Daily., Disp: , Rfl: 0  •   HYDROcodone-acetaminophen (NORCO) 7.5-325 MG per tablet, take 1 tablet by mouth every 6 hours if needed for pain, Disp: , Rfl: 0  •  Obeticholic Acid (OCALIVA) 5 MG tablet, Take 5 mg by mouth Daily., Disp: 30 tablet, Rfl: 6  •  PROAIR  (90 Base) MCG/ACT inhaler, , Disp: , Rfl:     ALLERGIES  Allergies   Allergen Reactions   • Levaquin [Levofloxacin] Other (See Comments)     Elevated liver enzymes, jaundice , intrahepatic cholestasis       FAMILY HISTORY:  Family History   Problem Relation Age of Onset   • No Known Problems Mother    • No Known Problems Father    • Colon polyps Neg Hx    • Colon cancer Neg Hx        SOCIAL HISTORY  Social History     Socioeconomic History   • Marital status:      Spouse name: Not on file   • Number of children: Not on file   • Years of education: Not on file   • Highest education level: Not on file   Tobacco Use   • Smoking status: Never Smoker   • Smokeless tobacco: Never Used   Substance and Sexual Activity   • Alcohol use: Yes     Frequency: Never     Comment: rarely   • Drug use: Yes     Types: Marijuana     Comment: Rarely    • Sexual activity: Yes     Socioeconomic History:  .  Lives at home with his wife.  Non-smoker.  Rarely drinks alcohol.  2.       REVIEW OF SYSTEMS  Review of Systems   Constitutional: Negative for activity change, appetite change, chills, diaphoresis, fatigue, fever, unexpected weight gain and unexpected weight loss.   HENT: Negative for trouble swallowing and voice change.    Gastrointestinal: Negative for abdominal distention, abdominal pain, anal bleeding, blood in stool, constipation, diarrhea, nausea, rectal pain, vomiting, GERD and indigestion.     I reviewed the above-noted review of systems    PHYSICAL EXAM   There were no vitals taken for this visit.  General: Alert and oriented x 3. In no apparent or acute distress.  and No stigmata of chronic liver disease  HEENT: Anicteric sclera.  Wearing facemask.  Neck: Supple. Without  lymphadenopathy  CV: Regular rate and rhythm, S1, S2  Lungs: Clear to ausculation. Without rales, rhonchi and wheezing  Abdomen:  Soft,non-distended without palpable masses or hepatosplenomeagaly, areas of rebound tenderness or guarding.   Extremeties: without clubbing, cyanosis or edema  Neurologic:  Alert and oriented x 3 without focal motor or sensory deficits  Rectal exam: deferred      Results Review:  I reviewed the patient's new clinical results.  See HPI      ASSESSMENT  1.-Primary bili cirrhosis  2.-Resolved Levaquin induced hepatotoxicity  3.-Coronary artery disease  4.-Iatrogenic coagulopathy  5.-History of eosinophilic esophagitis asymptomatic on omeprazole 20 mg daily    PLAN  1.-Schedule EGD for assessment of varices  2.-Schedule ultrasound for exclusion of hepatocellular carcinoma and elastography to reassess fibrosis  3.-Continue current obeticholic acid and ursodeoxycholic acid at unchanged dosing  4.-Follow-up appointment in 1 year    Portillo Matos MD  1/21/2021   13:59 EST

## 2021-02-21 ENCOUNTER — APPOINTMENT (OUTPATIENT)
Dept: PREADMISSION TESTING | Facility: HOSPITAL | Age: 66
End: 2021-02-21

## 2021-03-21 ENCOUNTER — APPOINTMENT (OUTPATIENT)
Dept: PREADMISSION TESTING | Facility: HOSPITAL | Age: 66
End: 2021-03-21

## 2021-03-23 ENCOUNTER — OUTSIDE FACILITY SERVICE (OUTPATIENT)
Dept: GASTROENTEROLOGY | Facility: CLINIC | Age: 66
End: 2021-03-23

## 2021-03-23 ENCOUNTER — HOSPITAL ENCOUNTER (OUTPATIENT)
Dept: ULTRASOUND IMAGING | Facility: HOSPITAL | Age: 66
Discharge: HOME | End: 2021-03-23
Admitting: INTERNAL MEDICINE

## 2021-03-23 DIAGNOSIS — K74.3 PRIMARY BILIARY CIRRHOSIS (HCC): ICD-10-CM

## 2021-03-23 PROCEDURE — 43235 EGD DIAGNOSTIC BRUSH WASH: CPT | Performed by: INTERNAL MEDICINE

## 2021-03-23 PROCEDURE — 76700 US EXAM ABDOM COMPLETE: CPT

## 2021-03-25 NOTE — PROGRESS NOTES
Regarding ultrasound of March 23, 2021:No evidence of hepatocellular carcinomaIncidental gallstones noted.  In the absence of symptoms no intervention necessaryKidney cyst noted.  No further evaluation necessary for this.

## 2021-08-03 ENCOUNTER — OFFICE VISIT (OUTPATIENT)
Dept: GASTROENTEROLOGY | Facility: CLINIC | Age: 66
End: 2021-08-03

## 2021-08-03 VITALS
DIASTOLIC BLOOD PRESSURE: 75 MMHG | BODY MASS INDEX: 28.51 KG/M2 | WEIGHT: 210.2 LBS | SYSTOLIC BLOOD PRESSURE: 156 MMHG | TEMPERATURE: 97.7 F | HEART RATE: 53 BPM

## 2021-08-03 DIAGNOSIS — Z86.010 HISTORY OF ADENOMATOUS POLYP OF COLON: ICD-10-CM

## 2021-08-03 DIAGNOSIS — R79.89 ELEVATED LFTS: ICD-10-CM

## 2021-08-03 DIAGNOSIS — T50.905A DRUG-INDUCED HEPATOTOXICITY: ICD-10-CM

## 2021-08-03 DIAGNOSIS — K71.6 DRUG-INDUCED HEPATOTOXICITY: ICD-10-CM

## 2021-08-03 DIAGNOSIS — K74.69 OTHER CIRRHOSIS OF LIVER (HCC): ICD-10-CM

## 2021-08-03 DIAGNOSIS — K20.0 ESOPHAGITIS, EOSINOPHILIC: ICD-10-CM

## 2021-08-03 DIAGNOSIS — K74.3 PRIMARY BILIARY CIRRHOSIS (HCC): Primary | ICD-10-CM

## 2021-08-03 PROCEDURE — 99214 OFFICE O/P EST MOD 30 MIN: CPT | Performed by: INTERNAL MEDICINE

## 2021-08-03 NOTE — PROGRESS NOTES
GASTROENTEROLOGY OFFICE NOTE  David Garrido Jr.      [id number]  1955      Chief Complaint   Patient presents with   • Follow-up   • Primary Biliary Cirrhosis        HISTORY OF PRESENT ILLNESS:  65-year-old white male with primary biliary cirrhosis here today for routine follow-up in the absence of any localizing complaint.  He is up-to-date on his colon cancer screening having had one in 2017 and is up-to-date on his EGDs for variceal surveillance which is not due for another year.  His last EGD was in March 2021 revealed no varices of the stomach or esophagus and no evidence of portal hypertensive gastropathy.    He remains on obeticholic acid as well as ursodeoxycholic acid.    He has asymptomatic gallstones.  He has a history of elevated liver enzymes secondary to drug-induced liver injury which has since resolved.    He denies dysphagia, odynophagia, early satiety, unexplained weight loss, melena or bright red blood per rectum.    His last labs were from February 2, 2021 at which time his AST and ALT were normal at 2519 respectively alkaline phosphatase was only mildly elevated to 181 units/L (high normal 144 units/L) total bilirubin was mildly elevated 1.3 mg/dL (high normal 1.2 mg/dL).  Total protein normal at 6.7 albumin normal 3.9 and globulin is normal 2.8.  I should note that an February 2020 his alkaline phosphatase was 237 and in October 2019 alkaline phosphatase was 178, February 2019 alkaline phosphatase was 231)    He is status post Covid positivity with mild symptoms at the beginning of the year with no residual symptoms or findings.    PAST MEDICAL HISTORY  Past Medical History:   Diagnosis Date   • Colon polyp    • Eosinophilic esophagitis    • Primary biliary cirrhosis (CMS/HCC)         PAST SURGICAL HISTORY  Past Surgical History:   Procedure Laterality Date   • CARDIAC SURGERY     • COLONOSCOPY     • CYSTOSCOPY INSERTION / REMOVAL STENT / STONE     • ENDOSCOPY           MEDICATIONS:    Current Outpatient Medications:   •  atorvastatin (LIPITOR) 40 MG tablet, Take 40 mg by mouth Daily., Disp: , Rfl:   •  clopidogrel (PLAVIX) 75 MG tablet, Take 75 mg by mouth Daily., Disp: , Rfl:   •  isosorbide mononitrate (IMDUR) 30 MG 24 hr tablet, Take 30 mg by mouth Daily., Disp: , Rfl:   •  lisinopril (PRINIVIL,ZESTRIL) 20 MG tablet, Take 20 mg by mouth Daily., Disp: , Rfl: 0  •  metoprolol succinate XL (TOPROL-XL) 25 MG 24 hr tablet, Take 25 mg by mouth Daily., Disp: , Rfl:   •  omeprazole (priLOSEC) 40 MG capsule, Take 40 mg by mouth Daily., Disp: , Rfl: 0  •  ursodiol (ACTIGALL) 500 MG tablet, Take 1,500 mg by mouth Daily., Disp: , Rfl: 0  •  HYDROcodone-acetaminophen (NORCO) 7.5-325 MG per tablet, take 1 tablet by mouth every 6 hours if needed for pain, Disp: , Rfl: 0  •  Obeticholic Acid (OCALIVA) 5 MG tablet, Take 5 mg by mouth Daily., Disp: 30 tablet, Rfl: 6  •  PROAIR  (90 Base) MCG/ACT inhaler, , Disp: , Rfl:     ALLERGIES  Allergies   Allergen Reactions   • Levaquin [Levofloxacin] Other (See Comments)     Elevated liver enzymes, jaundice , intrahepatic cholestasis       FAMILY HISTORY:  Family History   Problem Relation Age of Onset   • No Known Problems Mother    • No Known Problems Father    • Colon polyps Neg Hx    • Colon cancer Neg Hx        SOCIAL HISTORY  Social History     Socioeconomic History   • Marital status:      Spouse name: Not on file   • Number of children: Not on file   • Years of education: Not on file   • Highest education level: Not on file   Tobacco Use   • Smoking status: Never Smoker   • Smokeless tobacco: Never Used   Vaping Use   • Vaping Use: Never used   Substance and Sexual Activity   • Alcohol use: Yes     Comment: rarely   • Drug use: Yes     Types: Marijuana     Comment: Rarely    • Sexual activity: Yes     Socioeconomic History:  .  Non-smoker and rarely drinks alcoholic beverages.       REVIEW OF SYSTEMS  Review of Systems    Constitutional: Negative for activity change, appetite change, chills, diaphoresis, fatigue, fever, unexpected weight gain and unexpected weight loss.   HENT: Negative for trouble swallowing and voice change.    Gastrointestinal: Negative for abdominal distention, abdominal pain, anal bleeding, blood in stool, constipation, diarrhea, nausea, rectal pain, vomiting, GERD and indigestion.     I have reviewed the above-noted review of systems.    PHYSICAL EXAM   /75 (BP Location: Left arm, Patient Position: Sitting, Cuff Size: Adult)   Pulse 53   Temp 97.7 °F (36.5 °C) (Temporal)   Wt 95.3 kg (210 lb 3.2 oz)   BMI 28.51 kg/m²   General: Alert and oriented x 3. In no apparent or acute distress.  and No stigmata of chronic liver disease  HEENT: Anicteric sclerae. Normal oropharynx  Neck: Supple. Without lymphadenopathy  CV: Regular rate and rhythm, S1, S2  Lungs: Clear to ausculation. Without rales, rhonchi and wheezing  Abdomen:  Soft,non-distended without palpable masses or hepatosplenomeagaly, areas of rebound tenderness or guarding.   Extremeties: without clubbing, cyanosis or edema  Neurologic:  Alert and oriented x 3 without focal motor or sensory deficits  Rectal exam: deferred          ASSESSMENT  1.-Primary biliary cirrhosis on ursodeoxycholic acid/obeticholic acid  2.-Patient up-to-date on his colon cancer screening  3.-No evidence of portal hypertension except for mild borderline splenomegaly on March 23, 2021 ultrasound.  No hepatic masses seen  4.-Levaquin induced hepatic toxicity  5.-Status post COVID-19  6.-Iatrogenic coagulopathy  7.-History of eosinophilic esophagitis.  Asymptomatic on omeprazole 20 mg daily without dysphagia  A.-Coronary artery disease    PLAN  1.-Repeat ultrasound in November  2.-Check alpha-fetoprotein and lab/PT/INR to determine meld score  3.-Follow-up appointment in 1 year        Portillo Matos MD  8/3/2021   13:18 EDT

## 2021-08-11 ENCOUNTER — APPOINTMENT (OUTPATIENT)
Dept: ULTRASOUND IMAGING | Facility: HOSPITAL | Age: 66
End: 2021-08-11

## 2021-08-13 ENCOUNTER — HOSPITAL ENCOUNTER (OUTPATIENT)
Dept: ULTRASOUND IMAGING | Facility: HOSPITAL | Age: 66
Discharge: HOME | End: 2021-08-13
Admitting: INTERNAL MEDICINE

## 2021-08-13 DIAGNOSIS — K74.3 PRIMARY BILIARY CIRRHOSIS (HCC): ICD-10-CM

## 2021-08-13 PROCEDURE — 76705 ECHO EXAM OF ABDOMEN: CPT

## 2022-01-31 ENCOUNTER — TELEPHONE (OUTPATIENT)
Dept: GASTROENTEROLOGY | Facility: CLINIC | Age: 67
End: 2022-01-31

## 2022-01-31 DIAGNOSIS — K74.3 PRIMARY BILIARY CIRRHOSIS: ICD-10-CM

## 2022-01-31 DIAGNOSIS — K74.69 OTHER CIRRHOSIS OF LIVER: ICD-10-CM

## 2022-01-31 DIAGNOSIS — R79.89 ELEVATED LFTS: Primary | ICD-10-CM

## 2022-01-31 NOTE — TELEPHONE ENCOUNTER
Called and notified patient its time for his repeat ultrasound and labs. Patient notified the orders have been placed and he should be receiving a call from central scheduling .

## 2022-02-15 ENCOUNTER — HOSPITAL ENCOUNTER (OUTPATIENT)
Dept: ULTRASOUND IMAGING | Facility: HOSPITAL | Age: 67
Discharge: HOME | End: 2022-02-15
Admitting: INTERNAL MEDICINE

## 2022-02-15 DIAGNOSIS — R79.89 ELEVATED LFTS: ICD-10-CM

## 2022-02-15 DIAGNOSIS — K74.69 OTHER CIRRHOSIS OF LIVER: ICD-10-CM

## 2022-02-15 DIAGNOSIS — K74.3 PRIMARY BILIARY CIRRHOSIS: ICD-10-CM

## 2022-02-15 PROCEDURE — 76700 US EXAM ABDOM COMPLETE: CPT

## 2022-02-16 NOTE — PROGRESS NOTES
Ultrasound done for routine evaluation of cirrhosis/primary biliary cirrhosis. Outside of incidental cholelithiasis which is asymptomatic no evidence of biliary ductal dilation, no evidence of hepatocellular carcinoma. No evidence of biliary ductal dilation. Repeat in 6 months

## 2022-08-02 ENCOUNTER — OFFICE VISIT (OUTPATIENT)
Dept: GASTROENTEROLOGY | Facility: CLINIC | Age: 67
End: 2022-08-02

## 2022-08-02 ENCOUNTER — LAB (OUTPATIENT)
Dept: LAB | Facility: HOSPITAL | Age: 67
End: 2022-08-02

## 2022-08-02 VITALS
BODY MASS INDEX: 27.93 KG/M2 | TEMPERATURE: 97.3 F | WEIGHT: 206.2 LBS | HEART RATE: 50 BPM | OXYGEN SATURATION: 99 % | SYSTOLIC BLOOD PRESSURE: 132 MMHG | HEIGHT: 72 IN | DIASTOLIC BLOOD PRESSURE: 78 MMHG

## 2022-08-02 DIAGNOSIS — R79.89 ELEVATED LFTS: ICD-10-CM

## 2022-08-02 DIAGNOSIS — K74.69 OTHER CIRRHOSIS OF LIVER: ICD-10-CM

## 2022-08-02 DIAGNOSIS — K20.0 ESOPHAGITIS, EOSINOPHILIC: ICD-10-CM

## 2022-08-02 DIAGNOSIS — T50.905A DRUG-INDUCED HEPATOTOXICITY: ICD-10-CM

## 2022-08-02 DIAGNOSIS — K71.6 DRUG-INDUCED HEPATOTOXICITY: ICD-10-CM

## 2022-08-02 DIAGNOSIS — K74.3 PRIMARY BILIARY CIRRHOSIS: ICD-10-CM

## 2022-08-02 DIAGNOSIS — R93.2 ABNORMAL FINDINGS ON DIAGNOSTIC IMAGING OF LIVER AND BILIARY TRACT: ICD-10-CM

## 2022-08-02 DIAGNOSIS — Z86.010 HISTORY OF ADENOMATOUS POLYP OF COLON: ICD-10-CM

## 2022-08-02 DIAGNOSIS — K74.3 PRIMARY BILIARY CIRRHOSIS: Primary | ICD-10-CM

## 2022-08-02 LAB
ALBUMIN SERPL-MCNC: 4.3 G/DL (ref 3.5–5.2)
ALBUMIN/GLOB SERPL: 1.5 G/DL
ALP SERPL-CCNC: 173 U/L (ref 39–117)
ALPHA-FETOPROTEIN: 5 NG/ML (ref 0–8.3)
ALT SERPL W P-5'-P-CCNC: 27 U/L (ref 1–41)
AMMONIA BLD-SCNC: 28 UMOL/L (ref 16–60)
ANION GAP SERPL CALCULATED.3IONS-SCNC: 9.1 MMOL/L (ref 5–15)
AST SERPL-CCNC: 31 U/L (ref 1–40)
BASOPHILS # BLD AUTO: 0.04 10*3/MM3 (ref 0–0.2)
BASOPHILS NFR BLD AUTO: 0.6 % (ref 0–1.5)
BILIRUB CONJ SERPL-MCNC: 0.3 MG/DL (ref 0–0.3)
BILIRUB SERPL-MCNC: 1.6 MG/DL (ref 0–1.2)
BUN SERPL-MCNC: 19 MG/DL (ref 8–23)
BUN/CREAT SERPL: 16.7 (ref 7–25)
CALCIUM SPEC-SCNC: 9.4 MG/DL (ref 8.6–10.5)
CHLORIDE SERPL-SCNC: 106 MMOL/L (ref 98–107)
CO2 SERPL-SCNC: 25.9 MMOL/L (ref 22–29)
CREAT SERPL-MCNC: 1.14 MG/DL (ref 0.76–1.27)
DEPRECATED RDW RBC AUTO: 40.1 FL (ref 37–54)
EGFRCR SERPLBLD CKD-EPI 2021: 70.1 ML/MIN/1.73
EOSINOPHIL # BLD AUTO: 0.4 10*3/MM3 (ref 0–0.4)
EOSINOPHIL NFR BLD AUTO: 6.5 % (ref 0.3–6.2)
ERYTHROCYTE [DISTWIDTH] IN BLOOD BY AUTOMATED COUNT: 13.5 % (ref 12.3–15.4)
GLOBULIN UR ELPH-MCNC: 2.9 GM/DL
GLUCOSE SERPL-MCNC: 81 MG/DL (ref 65–99)
HCT VFR BLD AUTO: 49.1 % (ref 37.5–51)
HGB BLD-MCNC: 16.5 G/DL (ref 13–17.7)
INR PPP: 0.95 (ref 0.84–1.13)
LYMPHOCYTES # BLD AUTO: 1.77 10*3/MM3 (ref 0.7–3.1)
LYMPHOCYTES NFR BLD AUTO: 28.7 % (ref 19.6–45.3)
MCH RBC QN AUTO: 27.7 PG (ref 26.6–33)
MCHC RBC AUTO-ENTMCNC: 33.6 G/DL (ref 31.5–35.7)
MCV RBC AUTO: 82.5 FL (ref 79–97)
MONOCYTES # BLD AUTO: 0.53 10*3/MM3 (ref 0.1–0.9)
MONOCYTES NFR BLD AUTO: 8.6 % (ref 5–12)
NEUTROPHILS NFR BLD AUTO: 3.4 10*3/MM3 (ref 1.7–7)
NEUTROPHILS NFR BLD AUTO: 55.3 % (ref 42.7–76)
PLATELET # BLD AUTO: 147 10*3/MM3 (ref 140–450)
PMV BLD AUTO: 12.5 FL (ref 6–12)
POTASSIUM SERPL-SCNC: 4.5 MMOL/L (ref 3.5–5.2)
PROT SERPL-MCNC: 7.2 G/DL (ref 6–8.5)
PROTHROMBIN TIME: 12.6 SECONDS (ref 11.4–14.4)
RBC # BLD AUTO: 5.95 10*6/MM3 (ref 4.14–5.8)
SODIUM SERPL-SCNC: 141 MMOL/L (ref 136–145)
WBC NRBC COR # BLD: 6.16 10*3/MM3 (ref 3.4–10.8)

## 2022-08-02 PROCEDURE — 82248 BILIRUBIN DIRECT: CPT

## 2022-08-02 PROCEDURE — 85610 PROTHROMBIN TIME: CPT

## 2022-08-02 PROCEDURE — 82105 ALPHA-FETOPROTEIN SERUM: CPT

## 2022-08-02 PROCEDURE — 86381 MITOCHONDRIAL ANTIBODY EACH: CPT

## 2022-08-02 PROCEDURE — 82140 ASSAY OF AMMONIA: CPT

## 2022-08-02 PROCEDURE — 80053 COMPREHEN METABOLIC PANEL: CPT

## 2022-08-02 PROCEDURE — 36415 COLL VENOUS BLD VENIPUNCTURE: CPT

## 2022-08-02 PROCEDURE — 85025 COMPLETE CBC W/AUTO DIFF WBC: CPT

## 2022-08-02 PROCEDURE — 99214 OFFICE O/P EST MOD 30 MIN: CPT | Performed by: INTERNAL MEDICINE

## 2022-08-02 NOTE — PROGRESS NOTES
GASTROENTEROLOGY OFFICE NOTE  David Garrido Jr.  [id number]  1955      Chief Complaint   Patient presents with   • Cirrhosis     1 year follow up   • Primary biliary cirrhosis        HISTORY OF PRESENT ILLNESS:  Patient presents for follow-up of primary biliary cirrhosis with no evidence of decompensation denying abdominal distention, changes in mental status, acholic stools, dark urine, jaundice.    He is up-to-date on his colon cancer screening with his last colonoscopy in 2017 resulting in a recommendation for repeat screening in 2027.    EGDs in the past have failed to reveal esophageal varices.  Last EGD was in March 2021.  He does have a platelet count less than 200,000.    He is asymptomatic in terms of his gallstones which have been managed conservatively.    He denies any other localized GI complaints without dysphagia to solids, odynophagia, early satiety or unexplained weight loss.    PAST MEDICAL HISTORY  Past Medical History:   Diagnosis Date   • Colon polyp    • Eosinophilic esophagitis    • Primary biliary cirrhosis (HCC)         PAST SURGICAL HISTORY  Past Surgical History:   Procedure Laterality Date   • CARDIAC SURGERY     • COLONOSCOPY     • CYSTOSCOPY INSERTION / REMOVAL STENT / STONE     • ENDOSCOPY          MEDICATIONS:    Current Outpatient Medications:   •  atorvastatin (LIPITOR) 40 MG tablet, Take 40 mg by mouth Daily., Disp: , Rfl:   •  isosorbide mononitrate (IMDUR) 30 MG 24 hr tablet, Take 30 mg by mouth Daily., Disp: , Rfl:   •  lisinopril (PRINIVIL,ZESTRIL) 20 MG tablet, Take 20 mg by mouth Daily., Disp: , Rfl: 0  •  metoprolol succinate XL (TOPROL-XL) 25 MG 24 hr tablet, Take 25 mg by mouth Daily., Disp: , Rfl:   •  omeprazole (priLOSEC) 40 MG capsule, Take 40 mg by mouth Daily., Disp: , Rfl: 0  •  ursodiol (ACTIGALL) 500 MG tablet, Take 1,500 mg by mouth Daily., Disp: , Rfl: 0  •  clopidogrel (PLAVIX) 75 MG tablet, Take 75 mg by mouth Daily., Disp: , Rfl:  "    ALLERGIES  Allergies   Allergen Reactions   • Levaquin [Levofloxacin] Other (See Comments)     Elevated liver enzymes, jaundice , intrahepatic cholestasis       FAMILY HISTORY:  Family History   Problem Relation Age of Onset   • No Known Problems Mother    • No Known Problems Father    • Colon polyps Neg Hx    • Colon cancer Neg Hx        SOCIAL HISTORY  Social History     Socioeconomic History   • Marital status:    Tobacco Use   • Smoking status: Never Smoker   • Smokeless tobacco: Never Used   Vaping Use   • Vaping Use: Never used   Substance and Sexual Activity   • Alcohol use: Yes     Comment: rarely   • Drug use: Yes     Types: Marijuana     Comment: Rarely    • Sexual activity: Yes     Socioeconomic History:  .  5 children.  1 .  Non-smoker.  Uses marijuana rarely.  Does not abuse alcohol.         PHYSICAL EXAM   /78 (BP Location: Left arm, Patient Position: Sitting, Cuff Size: Adult)   Pulse 50   Temp 97.3 °F (36.3 °C) (Infrared)   Ht 182.9 cm (72\")   Wt 93.5 kg (206 lb 3.2 oz)   SpO2 99%   BMI 27.97 kg/m²   General: Alert and oriented x 3. In no apparent or acute distress.  and No stigmata of chronic liver disease  HEENT: Anicteric sclerae. Normal oropharynx  Neck: Supple. Without lymphadenopathy  CV: Regular rate and rhythm, S1, S2  Lungs: Clear to ausculation. Without rales, rhonchi and wheezing  Abdomen:  Soft,non-distended without palpable masses or hepatosplenomeagaly, areas of rebound tenderness or guarding.   Extremeties: without clubbing, cyanosis or edema  Neurologic:  Alert and oriented x 3 without focal motor or sensory deficits  Rectal exam: deferred     Results for orders placed or performed in visit on 20   Comprehensive Metabolic Panel    Specimen: Blood   Result Value Ref Range    Glucose 61 (L) 65 - 99 mg/dL    BUN 19 8 - 23 mg/dL    Creatinine 1.38 (H) 0.76 - 1.27 mg/dL    Sodium 143 136 - 145 mmol/L    Potassium 4.2 3.5 - 5.2 mmol/L    Chloride " 103 98 - 107 mmol/L    CO2 25.1 22.0 - 29.0 mmol/L    Calcium 9.3 8.6 - 10.5 mg/dL    Total Protein 7.2 6.0 - 8.5 g/dL    Albumin 4.00 3.50 - 5.20 g/dL    ALT (SGPT) 32 1 - 41 U/L    AST (SGOT) 31 1 - 40 U/L    Alkaline Phosphatase 237 (H) 39 - 117 U/L    Total Bilirubin 0.8 0.2 - 1.2 mg/dL    eGFR Non African Amer 52 (L) >60 mL/min/1.73    Globulin 3.2 gm/dL    A/G Ratio 1.3 g/dL    BUN/Creatinine Ratio 13.8 7.0 - 25.0    Anion Gap 14.9 5.0 - 15.0 mmol/L   Protime-INR    Specimen: Blood   Result Value Ref Range    Protime 12.7 11.2 - 14.3 Seconds    INR 1.00 0.85 - 1.16        Results Review:  I reviewed the patient's new clinical results.      ASSESSMENT  1.-Primary biliary cirrhosis on ursodeoxycholic acid/obeticholic acid.  No evidence of decompensation  2.-Patient up-to-date on his colon cancer screening due for screening again in 2027  3.-No evidence of portal hypertension except for mild borderline splenomegaly on March 23, 2021 ultrasound.  No hepatic masses seen mild thrombocytopenia however does suggest some element of portal hypertension  4.-Levaquin induced hepatic toxicity  5.-Status post COVID-19  6.-Iatrogenic coagulopathy3  7.-History of eosinophilic esophagitis.  Asymptomatic on omeprazole 20 mg daily without dysphagia  8.-Coronary artery disease    PLAN  1.-  Schedule EGD for reassessment of eosinophilic esophagitis but more importantly for exclusion of esophageal varices  2.-  Alpha-fetoprotein  3.-  Ultrasound rule out hepatocellular carcinoma  4.-  Follow-up appointment in 1 year  5.-  Colonoscopy due in 2027      Portillo Matos MD  8/2/2022   17:50 EDT      Addendum  Outside labs from July 19, 2022 received.  Alkaline phosphatase elevated at 182 units/L (high normal 116).  AST normal at 33 units/L.  Total bilirubin mildly elevated at 1.6 mg/dL    White count 5.2/hemoglobin 15.1/hematocrit 46.5/MCV 84.2/platelet count 149,000

## 2022-08-03 LAB — MITOCHONDRIA M2 IGG SER-ACNC: 121.1 UNITS (ref 0–20)

## 2022-08-12 ENCOUNTER — OUTSIDE FACILITY SERVICE (OUTPATIENT)
Dept: GASTROENTEROLOGY | Facility: CLINIC | Age: 67
End: 2022-08-12

## 2022-08-12 PROCEDURE — 88305 TISSUE EXAM BY PATHOLOGIST: CPT | Performed by: INTERNAL MEDICINE

## 2022-08-12 PROCEDURE — 43251 EGD REMOVE LESION SNARE: CPT | Performed by: INTERNAL MEDICINE

## 2022-08-12 PROCEDURE — 43249 ESOPH EGD DILATION <30 MM: CPT | Performed by: INTERNAL MEDICINE

## 2022-08-15 ENCOUNTER — LAB REQUISITION (OUTPATIENT)
Dept: LAB | Facility: HOSPITAL | Age: 67
End: 2022-08-15

## 2022-08-15 DIAGNOSIS — K74.60 UNSPECIFIED CIRRHOSIS OF LIVER: ICD-10-CM

## 2022-08-15 DIAGNOSIS — R13.10 DYSPHAGIA, UNSPECIFIED: ICD-10-CM

## 2022-08-15 DIAGNOSIS — K31.7 POLYP OF STOMACH AND DUODENUM: ICD-10-CM

## 2022-08-23 ENCOUNTER — HOSPITAL ENCOUNTER (OUTPATIENT)
Dept: ULTRASOUND IMAGING | Facility: HOSPITAL | Age: 67
Discharge: HOME | End: 2022-08-23
Admitting: INTERNAL MEDICINE

## 2022-08-23 DIAGNOSIS — K74.3 PRIMARY BILIARY CIRRHOSIS: ICD-10-CM

## 2022-08-23 PROCEDURE — 76705 ECHO EXAM OF ABDOMEN: CPT

## 2022-08-25 NOTE — PROGRESS NOTES
Ultrasound looks good.  Meaning, no cancer.  Of course evidence of cirrhosis.  Gallstones noted but no symptoms therefore no need for intervention.  Repeat in 6 months

## 2022-09-15 DIAGNOSIS — K74.3 PRIMARY BILIARY CIRRHOSIS: Primary | ICD-10-CM

## 2023-03-15 ENCOUNTER — HOSPITAL ENCOUNTER (OUTPATIENT)
Dept: ULTRASOUND IMAGING | Facility: HOSPITAL | Age: 68
Discharge: HOME | End: 2023-03-15
Admitting: INTERNAL MEDICINE
Payer: MEDICARE

## 2023-03-15 DIAGNOSIS — K74.3 PRIMARY BILIARY CIRRHOSIS: ICD-10-CM

## 2023-03-15 PROCEDURE — 76705 ECHO EXAM OF ABDOMEN: CPT

## 2023-03-15 NOTE — PROGRESS NOTES
Routine screening ultrasound for cirrhosis, rule out hepatocellular carcinoma is noted with no new findings.  Findings consistent with cirrhosis are noted but more importantly no mass lesions are noted in the liver.  Recheck in 6 months.  We will also need to see his alpha-fetoprotein.  This also needs to be checked every 6 months

## 2023-03-23 ENCOUNTER — TELEPHONE (OUTPATIENT)
Dept: GASTROENTEROLOGY | Facility: CLINIC | Age: 68
End: 2023-03-23
Payer: MEDICARE

## 2023-03-23 DIAGNOSIS — K74.69 OTHER CIRRHOSIS OF LIVER: Primary | ICD-10-CM

## 2023-03-23 NOTE — TELEPHONE ENCOUNTER
Spoke with patient Per Dr. Matos for review of US findings. Advised Pt of letter to review and that labs are needed and US recheck in 6 month. Pt verbalized understanding and asked for lab order to be sent to him so that he could get them done close to home.

## 2023-03-23 NOTE — TELEPHONE ENCOUNTER
----- Message from Portillo Matos MD sent at 3/15/2023  2:02 PM EDT -----  Routine screening ultrasound for cirrhosis, rule out hepatocellular carcinoma is noted with no new findings.  Findings consistent with cirrhosis are noted but more importantly no mass lesions are noted in the liver.  Recheck in 6 months.  We will also need to see his alpha-fetoprotein.  This also needs to be checked every 6 months

## 2023-08-22 ENCOUNTER — OFFICE VISIT (OUTPATIENT)
Dept: GASTROENTEROLOGY | Facility: CLINIC | Age: 68
End: 2023-08-22
Payer: MEDICARE

## 2023-08-22 ENCOUNTER — LAB (OUTPATIENT)
Dept: LAB | Facility: HOSPITAL | Age: 68
End: 2023-08-22
Payer: MEDICARE

## 2023-08-22 VITALS
HEIGHT: 72 IN | WEIGHT: 195 LBS | DIASTOLIC BLOOD PRESSURE: 69 MMHG | BODY MASS INDEX: 26.41 KG/M2 | HEART RATE: 67 BPM | SYSTOLIC BLOOD PRESSURE: 141 MMHG

## 2023-08-22 DIAGNOSIS — R97.8 OTHER ABNORMAL TUMOR MARKERS: ICD-10-CM

## 2023-08-22 DIAGNOSIS — K74.3 PRIMARY BILIARY CIRRHOSIS: Primary | ICD-10-CM

## 2023-08-22 DIAGNOSIS — K74.3 PRIMARY BILIARY CIRRHOSIS: ICD-10-CM

## 2023-08-22 LAB
ALBUMIN SERPL-MCNC: 3.7 G/DL (ref 3.5–5.2)
ALBUMIN/GLOB SERPL: 1.2 G/DL
ALP SERPL-CCNC: 256 U/L (ref 39–117)
ALPHA-FETOPROTEIN: 3.55 NG/ML (ref 0–8.3)
ALT SERPL W P-5'-P-CCNC: 17 U/L (ref 1–41)
ANION GAP SERPL CALCULATED.3IONS-SCNC: 10.1 MMOL/L (ref 5–15)
AST SERPL-CCNC: 24 U/L (ref 1–40)
BASOPHILS # BLD AUTO: 0.06 10*3/MM3 (ref 0–0.2)
BASOPHILS NFR BLD AUTO: 0.8 % (ref 0–1.5)
BILIRUB SERPL-MCNC: 1 MG/DL (ref 0–1.2)
BUN SERPL-MCNC: 22 MG/DL (ref 8–23)
BUN/CREAT SERPL: 21.8 (ref 7–25)
CALCIUM SPEC-SCNC: 9.6 MG/DL (ref 8.6–10.5)
CHLORIDE SERPL-SCNC: 106 MMOL/L (ref 98–107)
CO2 SERPL-SCNC: 25.9 MMOL/L (ref 22–29)
CREAT SERPL-MCNC: 1.01 MG/DL (ref 0.76–1.27)
DEPRECATED RDW RBC AUTO: 38.7 FL (ref 37–54)
EGFRCR SERPLBLD CKD-EPI 2021: 80.5 ML/MIN/1.73
EOSINOPHIL # BLD AUTO: 0.49 10*3/MM3 (ref 0–0.4)
EOSINOPHIL NFR BLD AUTO: 6.3 % (ref 0.3–6.2)
ERYTHROCYTE [DISTWIDTH] IN BLOOD BY AUTOMATED COUNT: 13.3 % (ref 12.3–15.4)
GLOBULIN UR ELPH-MCNC: 3.2 GM/DL
GLUCOSE SERPL-MCNC: 87 MG/DL (ref 65–99)
HCT VFR BLD AUTO: 39.7 % (ref 37.5–51)
HGB BLD-MCNC: 13.1 G/DL (ref 13–17.7)
IMM GRANULOCYTES # BLD AUTO: 0.08 10*3/MM3 (ref 0–0.05)
IMM GRANULOCYTES NFR BLD AUTO: 1 % (ref 0–0.5)
INR PPP: 1.02 (ref 0.89–1.12)
LYMPHOCYTES # BLD AUTO: 1.55 10*3/MM3 (ref 0.7–3.1)
LYMPHOCYTES NFR BLD AUTO: 19.9 % (ref 19.6–45.3)
MCH RBC QN AUTO: 26.8 PG (ref 26.6–33)
MCHC RBC AUTO-ENTMCNC: 33 G/DL (ref 31.5–35.7)
MCV RBC AUTO: 81.2 FL (ref 79–97)
MONOCYTES # BLD AUTO: 0.98 10*3/MM3 (ref 0.1–0.9)
MONOCYTES NFR BLD AUTO: 12.6 % (ref 5–12)
NEUTROPHILS NFR BLD AUTO: 4.61 10*3/MM3 (ref 1.7–7)
NEUTROPHILS NFR BLD AUTO: 59.4 % (ref 42.7–76)
NRBC BLD AUTO-RTO: 0 /100 WBC (ref 0–0.2)
PLATELET # BLD AUTO: 304 10*3/MM3 (ref 140–450)
PMV BLD AUTO: 12 FL (ref 6–12)
POTASSIUM SERPL-SCNC: 4.8 MMOL/L (ref 3.5–5.2)
PROT SERPL-MCNC: 6.9 G/DL (ref 6–8.5)
PROTHROMBIN TIME: 13.5 SECONDS (ref 12.2–14.5)
RBC # BLD AUTO: 4.89 10*6/MM3 (ref 4.14–5.8)
SODIUM SERPL-SCNC: 142 MMOL/L (ref 136–145)
WBC NRBC COR # BLD: 7.77 10*3/MM3 (ref 3.4–10.8)

## 2023-08-22 PROCEDURE — 85025 COMPLETE CBC W/AUTO DIFF WBC: CPT

## 2023-08-22 PROCEDURE — 82105 ALPHA-FETOPROTEIN SERUM: CPT

## 2023-08-22 PROCEDURE — 80053 COMPREHEN METABOLIC PANEL: CPT

## 2023-08-22 PROCEDURE — 85610 PROTHROMBIN TIME: CPT

## 2023-08-22 PROCEDURE — 1160F RVW MEDS BY RX/DR IN RCRD: CPT | Performed by: INTERNAL MEDICINE

## 2023-08-22 PROCEDURE — 1159F MED LIST DOCD IN RCRD: CPT | Performed by: INTERNAL MEDICINE

## 2023-08-22 PROCEDURE — 99214 OFFICE O/P EST MOD 30 MIN: CPT | Performed by: INTERNAL MEDICINE

## 2023-08-22 RX ORDER — METHYLPREDNISOLONE 4 MG/1
TABLET ORAL
COMMUNITY
Start: 2023-05-04 | End: 2023-08-22

## 2023-08-22 RX ORDER — AMLODIPINE BESYLATE 2.5 MG/1
2.5 TABLET ORAL DAILY
COMMUNITY

## 2023-08-22 RX ORDER — LEVOCETIRIZINE DIHYDROCHLORIDE 5 MG/1
1 TABLET, FILM COATED ORAL DAILY
COMMUNITY
Start: 2023-07-06 | End: 2023-08-22

## 2023-08-22 RX ORDER — ISOSORBIDE DINITRATE 30 MG/1
30 TABLET ORAL 4 TIMES DAILY
COMMUNITY
End: 2023-08-22

## 2023-08-22 NOTE — PROGRESS NOTES
GASTROENTEROLOGY OFFICE NOTE  David Garrido Jr.  [id number]  1955      Chief Complaint   Patient presents with    Primary Biliary Cirrhosis, Eosinophilic Esophagitis        HISTORY OF PRESENT ILLNESS:  Patient with primary biliary cholangitis that has done well on Actigall 1500 mg/day.  He also has a history of eosinophilic esophagitis.  In the past he has had drug-induced liver injury which took some time to resolve.  At that time it was related to Levaquin    He is not having any problems with dysphagia to solids and denies odynophagia, early satiety or unexplained weight loss    He has upcoming knee surgery and is being told that he will be on antibiotics in the perioperative period.  He is counseled to make sure that there is no evidence of liver toxicity with what ever antibiotic and may described.    Since I last saw him he had a episode of hypertension which ultimately required evaluation in the intensive care unit.    Last EGD was in March 2021 which did not show any esophageal varices.    He has gallstones which have remained asymptomatic    PAST MEDICAL HISTORY  Past Medical History:    Colon polyp    Eosinophilic esophagitis    Hypertension    Primary biliary cirrhosis        PAST SURGICAL HISTORY  Past Surgical History:    CARDIAC SURGERY    COLONOSCOPY    CYSTOSCOPY INSERTION / REMOVAL STENT / STONE    ENDOSCOPY        MEDICATIONS:    Current Outpatient Medications:     amLODIPine (NORVASC) 2.5 MG tablet, Take 1 tablet by mouth Daily., Disp: , Rfl:     atorvastatin (LIPITOR) 40 MG tablet, Take 1 tablet by mouth Daily., Disp: , Rfl:     isosorbide mononitrate (IMDUR) 30 MG 24 hr tablet, Take 1 tablet by mouth Daily., Disp: , Rfl:     levocetirizine (XYZAL) 5 MG tablet, Take 1 tablet by mouth Daily., Disp: , Rfl:     lisinopril (PRINIVIL,ZESTRIL) 20 MG tablet, Take 1 tablet by mouth Daily., Disp: , Rfl: 0    methylPREDNISolone (MEDROL) 4 MG dose pack, follow package directions, Disp: , Rfl:      "metoprolol succinate XL (TOPROL-XL) 25 MG 24 hr tablet, Take 1 tablet by mouth Daily., Disp: , Rfl:     omeprazole (priLOSEC) 40 MG capsule, Take 1 capsule by mouth Daily., Disp: , Rfl: 0    ursodiol (ACTIGALL) 500 MG tablet, Take 3 tablets by mouth Daily., Disp: , Rfl: 0    clopidogrel (PLAVIX) 75 MG tablet, Take 1 tablet by mouth Daily., Disp: , Rfl:     isosorbide dinitrate (ISORDIL) 30 MG tablet, Take 1 tablet by mouth 4 (Four) Times a Day., Disp: , Rfl:     ALLERGIES  is allergic to levaquin [levofloxacin].    FAMILY HISTORY:  Cancer-related family history is negative for Colon cancer.  Colon Cancer-related family history is negative for Colon polyps and Colon cancer.    SOCIAL HISTORY  He  reports that he has never smoked. He has never used smokeless tobacco. He reports current alcohol use. He reports current drug use. Drug: Marijuana.   .  5 children.  1 disease.  Non-smoker.  Rarely uses marijuana.  Does not abuse alcohol.    PHYSICAL EXAM   /69 (BP Location: Left arm, Patient Position: Sitting, Cuff Size: Large Adult)   Pulse 67   Ht 182.9 cm (72\")   Wt 88.5 kg (195 lb)   BMI 26.45 kg/mý   General: Alert and oriented x 3. In no apparent or acute distress.  and No stigmata of chronic liver disease  HEENT: Anicteric sclerae. Normal oropharynx  Neck: Supple. Without lymphadenopathy  CV: Regular rate and rhythm, S1, S2  Lungs: Clear to ausculation. Without rales, rhonchi and wheezing  Abdomen:  Soft,non-distended without palpable masses or hepatosplenomeagaly, areas of rebound tenderness or guarding.   Extremeties: without clubbing, cyanosis or edema  Neurologic:  Alert and oriented x 3 without focal motor or sensory deficits  Rectal exam: deferred       ASSESSMENT  1.-Primary biliary cholangitis on ursodeoxycholic acid 1500 mg/day  2.-Eosinophilic esophagitis.  Asymptomatic  3.-Cirrhosis secondary to primary biliary cholangitis.  No evidence of decompensated liver disease  4.-Patient " up-to-date on his colon cancer screening  5.-Upcoming knee surgery.  Patient is a Alba class A and is at low risk for postoperative complications  6.-Patient up-to-date on his colon cancer screening due for screening again in 2027.      PLAN  1.-Check CBC  2.-Check some liver enzyme  3.-Schedule EGD to rule out esophageal variceal development  4.-Consider initiation of Coreg/carvedilol given underlying cirrhosis and benefits in diminishing progression of liver disease with carvedilol which is not noted with metoprolol  5.-Screening colonoscopy due in 2027      Portillo Matos MD  8/22/2023   14:12 EDT      Addendum labs from August 27, 2023 reviewed.  Alkaline phosphatase elevated 256 units/L but normal bilirubin of 1.0 mg/dL (down from prior bilirubin of 1.6 mg/dL 22).  CBC is unremarkable with a 7.77 white count, hemoglobin 13.1, MCV of 81.2 and a platelet count of 304,000.  Without time INR is 1.02

## 2024-01-09 ENCOUNTER — TELEPHONE (OUTPATIENT)
Dept: GASTROENTEROLOGY | Facility: CLINIC | Age: 69
End: 2024-01-09
Payer: MEDICARE

## 2024-01-09 RX ORDER — URSODIOL 500 MG/1
1500 TABLET, FILM COATED ORAL DAILY
Qty: 90 TABLET | Refills: 11 | Status: SHIPPED | OUTPATIENT
Start: 2024-01-09

## 2024-01-09 NOTE — TELEPHONE ENCOUNTER
Caller: David Garrido Jr.    Relationship: Self    Best call back number: [phone]     Requested Prescriptions:   Requested Prescriptions      No prescriptions requested or ordered in this encounter    URSODIOL    Pharmacy where request should be sent:    [facility]  Last office visit with prescribing clinician: 8/22/2023      Next office visit with prescribing clinician: 8/27/2024     Does the patient have less than a 3 day supply:  [] Yes  [x] No    Would you like a call back once the refill request has been completed: [] Yes [x] No    If the office needs to give you a call back, can they leave a voicemail: [] Yes [x] No    Carlton Martin Rep   01/09/24 16:04 EST

## 2024-04-14 ENCOUNTER — HOSPITAL ENCOUNTER (OUTPATIENT)
Dept: ULTRASOUND IMAGING | Facility: HOSPITAL | Age: 69
Discharge: HOME | End: 2024-04-14
Payer: MEDICARE

## 2024-04-14 ENCOUNTER — LAB (OUTPATIENT)
Dept: LAB | Facility: HOSPITAL | Age: 69
End: 2024-04-14
Payer: MEDICARE

## 2024-04-14 DIAGNOSIS — K74.3 PRIMARY BILIARY CIRRHOSIS: ICD-10-CM

## 2024-04-14 DIAGNOSIS — R97.8 OTHER ABNORMAL TUMOR MARKERS: ICD-10-CM

## 2024-04-14 LAB
ALBUMIN SERPL-MCNC: 4.2 G/DL (ref 3.5–5.2)
ALBUMIN/GLOB SERPL: 1.6 G/DL
ALP SERPL-CCNC: 243 U/L (ref 39–117)
ALPHA-FETOPROTEIN: 2.71 NG/ML (ref 0–8.3)
ALT SERPL W P-5'-P-CCNC: 24 U/L (ref 1–41)
ANION GAP SERPL CALCULATED.3IONS-SCNC: 9 MMOL/L (ref 5–15)
AST SERPL-CCNC: 26 U/L (ref 1–40)
BASOPHILS # BLD AUTO: 0.08 10*3/MM3 (ref 0–0.2)
BASOPHILS NFR BLD AUTO: 1 % (ref 0–1.5)
BILIRUB SERPL-MCNC: 1.3 MG/DL (ref 0–1.2)
BUN SERPL-MCNC: 20 MG/DL (ref 8–23)
BUN/CREAT SERPL: 15.4 (ref 7–25)
CALCIUM SPEC-SCNC: 9.2 MG/DL (ref 8.6–10.5)
CHLORIDE SERPL-SCNC: 109 MMOL/L (ref 98–107)
CO2 SERPL-SCNC: 28 MMOL/L (ref 22–29)
CREAT SERPL-MCNC: 1.3 MG/DL (ref 0.76–1.27)
DEPRECATED RDW RBC AUTO: 46.3 FL (ref 37–54)
EGFRCR SERPLBLD CKD-EPI 2021: 59.1 ML/MIN/1.73
EOSINOPHIL # BLD AUTO: 1.37 10*3/MM3 (ref 0–0.4)
EOSINOPHIL NFR BLD AUTO: 17 % (ref 0.3–6.2)
ERYTHROCYTE [DISTWIDTH] IN BLOOD BY AUTOMATED COUNT: 15.3 % (ref 12.3–15.4)
GLOBULIN UR ELPH-MCNC: 2.7 GM/DL
GLUCOSE SERPL-MCNC: 85 MG/DL (ref 65–99)
HCT VFR BLD AUTO: 49.1 % (ref 37.5–51)
HGB BLD-MCNC: 15.3 G/DL (ref 13–17.7)
IMM GRANULOCYTES # BLD AUTO: 0.02 10*3/MM3 (ref 0–0.05)
IMM GRANULOCYTES NFR BLD AUTO: 0.2 % (ref 0–0.5)
INR PPP: 0.98 (ref 0.89–1.12)
LYMPHOCYTES # BLD AUTO: 1.53 10*3/MM3 (ref 0.7–3.1)
LYMPHOCYTES NFR BLD AUTO: 19 % (ref 19.6–45.3)
MCH RBC QN AUTO: 25.8 PG (ref 26.6–33)
MCHC RBC AUTO-ENTMCNC: 31.2 G/DL (ref 31.5–35.7)
MCV RBC AUTO: 82.9 FL (ref 79–97)
MONOCYTES # BLD AUTO: 0.71 10*3/MM3 (ref 0.1–0.9)
MONOCYTES NFR BLD AUTO: 8.8 % (ref 5–12)
NEUTROPHILS NFR BLD AUTO: 4.35 10*3/MM3 (ref 1.7–7)
NEUTROPHILS NFR BLD AUTO: 54 % (ref 42.7–76)
NRBC BLD AUTO-RTO: 0 /100 WBC (ref 0–0.2)
PLATELET # BLD AUTO: 192 10*3/MM3 (ref 140–450)
PMV BLD AUTO: 12.1 FL (ref 6–12)
POTASSIUM SERPL-SCNC: 5.1 MMOL/L (ref 3.5–5.2)
PROT SERPL-MCNC: 6.9 G/DL (ref 6–8.5)
PROTHROMBIN TIME: 13.1 SECONDS (ref 12.2–14.5)
RBC # BLD AUTO: 5.92 10*6/MM3 (ref 4.14–5.8)
SODIUM SERPL-SCNC: 146 MMOL/L (ref 136–145)
WBC NRBC COR # BLD AUTO: 8.06 10*3/MM3 (ref 3.4–10.8)

## 2024-04-14 PROCEDURE — 85610 PROTHROMBIN TIME: CPT

## 2024-04-14 PROCEDURE — 82105 ALPHA-FETOPROTEIN SERUM: CPT

## 2024-04-14 PROCEDURE — 76981 USE PARENCHYMA: CPT

## 2024-04-14 PROCEDURE — 80053 COMPREHEN METABOLIC PANEL: CPT

## 2024-04-14 PROCEDURE — 76705 ECHO EXAM OF ABDOMEN: CPT

## 2024-04-14 PROCEDURE — 85025 COMPLETE CBC W/AUTO DIFF WBC: CPT

## 2024-04-17 NOTE — PROGRESS NOTES
"Patient with diagnosis of PBC/primary biliary cholangitis.  Routine ultrasound for hepatocellular carcinoma screening reveals no evidence of hepatocellular carcinoma and elastography suggestive of \"low probability of clinically significant fibrosis \"."

## 2024-04-17 NOTE — PROGRESS NOTES
Patient with PBC/primary biliary cholangitis.  Routine labs reveal elevated alkaline phosphatase of 243 units/L.  This is in keeping with his general range over the past few years.  Total bilirubin is slightly elevated 1.3.  CBC is unremarkable..  Ultrasound reveals no evidence of hepatocellular carcinoma.  Normal alpha-fetoprotein is noted.  No changes current therapy recommended.  Continued monitoring recommended

## 2024-08-19 DIAGNOSIS — K74.3 PRIMARY BILIARY CIRRHOSIS: Primary | ICD-10-CM

## 2024-08-19 DIAGNOSIS — R97.8 OTHER ABNORMAL TUMOR MARKERS: ICD-10-CM

## 2024-08-27 ENCOUNTER — OFFICE VISIT (OUTPATIENT)
Dept: GASTROENTEROLOGY | Facility: CLINIC | Age: 69
End: 2024-08-27
Payer: MEDICARE

## 2024-08-27 ENCOUNTER — LAB (OUTPATIENT)
Dept: LAB | Facility: HOSPITAL | Age: 69
End: 2024-08-27
Payer: MEDICARE

## 2024-08-27 VITALS
DIASTOLIC BLOOD PRESSURE: 70 MMHG | SYSTOLIC BLOOD PRESSURE: 138 MMHG | BODY MASS INDEX: 28.39 KG/M2 | WEIGHT: 209.6 LBS | HEIGHT: 72 IN

## 2024-08-27 DIAGNOSIS — K74.3 PRIMARY BILIARY CIRRHOSIS: ICD-10-CM

## 2024-08-27 DIAGNOSIS — Z86.010 HISTORY OF ADENOMATOUS POLYP OF COLON: ICD-10-CM

## 2024-08-27 DIAGNOSIS — K74.69 OTHER CIRRHOSIS OF LIVER: ICD-10-CM

## 2024-08-27 DIAGNOSIS — R74.8 ELEVATED LIVER ENZYMES: ICD-10-CM

## 2024-08-27 DIAGNOSIS — K20.0 ESOPHAGITIS, EOSINOPHILIC: ICD-10-CM

## 2024-08-27 DIAGNOSIS — K74.3 PRIMARY BILIARY CIRRHOSIS: Primary | ICD-10-CM

## 2024-08-27 LAB
ALBUMIN SERPL-MCNC: 4.3 G/DL (ref 3.5–5.2)
ALBUMIN/GLOB SERPL: 1.5 G/DL
ALP SERPL-CCNC: 227 U/L (ref 39–117)
ALPHA-FETOPROTEIN: 3.75 NG/ML (ref 0–8.3)
ALT SERPL W P-5'-P-CCNC: 38 U/L (ref 1–41)
ANION GAP SERPL CALCULATED.3IONS-SCNC: 11 MMOL/L (ref 5–15)
AST SERPL-CCNC: 44 U/L (ref 1–40)
BILIRUB SERPL-MCNC: 1.7 MG/DL (ref 0–1.2)
BUN SERPL-MCNC: 27 MG/DL (ref 8–23)
BUN/CREAT SERPL: 19.7 (ref 7–25)
CALCIUM SPEC-SCNC: 9.7 MG/DL (ref 8.6–10.5)
CHLORIDE SERPL-SCNC: 106 MMOL/L (ref 98–107)
CO2 SERPL-SCNC: 26 MMOL/L (ref 22–29)
CREAT SERPL-MCNC: 1.37 MG/DL (ref 0.76–1.27)
DEPRECATED RDW RBC AUTO: 41.5 FL (ref 37–54)
EGFRCR SERPLBLD CKD-EPI 2021: 55.5 ML/MIN/1.73
ERYTHROCYTE [DISTWIDTH] IN BLOOD BY AUTOMATED COUNT: 14 % (ref 12.3–15.4)
GLOBULIN UR ELPH-MCNC: 2.9 GM/DL
GLUCOSE SERPL-MCNC: 88 MG/DL (ref 65–99)
HCT VFR BLD AUTO: 50.2 % (ref 37.5–51)
HGB BLD-MCNC: 16.9 G/DL (ref 13–17.7)
INR PPP: 0.98 (ref 0.89–1.12)
MCH RBC QN AUTO: 28.2 PG (ref 26.6–33)
MCHC RBC AUTO-ENTMCNC: 33.7 G/DL (ref 31.5–35.7)
MCV RBC AUTO: 83.7 FL (ref 79–97)
PLATELET # BLD AUTO: 159 10*3/MM3 (ref 140–450)
PMV BLD AUTO: 12.4 FL (ref 6–12)
POTASSIUM SERPL-SCNC: 5 MMOL/L (ref 3.5–5.2)
PROT SERPL-MCNC: 7.2 G/DL (ref 6–8.5)
PROTHROMBIN TIME: 13.1 SECONDS (ref 12.2–14.5)
RBC # BLD AUTO: 6 10*6/MM3 (ref 4.14–5.8)
SODIUM SERPL-SCNC: 143 MMOL/L (ref 136–145)
WBC NRBC COR # BLD AUTO: 6.91 10*3/MM3 (ref 3.4–10.8)

## 2024-08-27 PROCEDURE — 85027 COMPLETE CBC AUTOMATED: CPT

## 2024-08-27 PROCEDURE — 82105 ALPHA-FETOPROTEIN SERUM: CPT

## 2024-08-27 PROCEDURE — 1159F MED LIST DOCD IN RCRD: CPT | Performed by: INTERNAL MEDICINE

## 2024-08-27 PROCEDURE — 1160F RVW MEDS BY RX/DR IN RCRD: CPT | Performed by: INTERNAL MEDICINE

## 2024-08-27 PROCEDURE — 85610 PROTHROMBIN TIME: CPT

## 2024-08-27 PROCEDURE — 99214 OFFICE O/P EST MOD 30 MIN: CPT | Performed by: INTERNAL MEDICINE

## 2024-08-27 PROCEDURE — 80053 COMPREHEN METABOLIC PANEL: CPT

## 2024-08-27 RX ORDER — LEVOCETIRIZINE DIHYDROCHLORIDE 5 MG/1
1 TABLET, FILM COATED ORAL DAILY
COMMUNITY
Start: 2024-05-06

## 2024-08-27 RX ORDER — BUDESONIDE, GLYCOPYRROLATE, AND FORMOTEROL FUMARATE 160; 9; 4.8 UG/1; UG/1; UG/1
2 AEROSOL, METERED RESPIRATORY (INHALATION) 2 TIMES DAILY
COMMUNITY

## 2024-08-27 RX ORDER — ASPIRIN 81 MG/1
81 TABLET ORAL DAILY
COMMUNITY
Start: 2024-07-08

## 2024-08-27 RX ORDER — ALBUTEROL SULFATE 90 UG/1
2 AEROSOL, METERED RESPIRATORY (INHALATION) EVERY 4 HOURS PRN
COMMUNITY
End: 2024-08-30

## 2024-08-27 NOTE — PROGRESS NOTES
"GASTROENTEROLOGY OFFICE NOTE  David Garrido Jr.  [id number]  1955      Chief Complaint   Patient presents with    Primary Biliary Cirrhosis, Eosinophilic Esophagitis        HISTORY OF PRESENT ILLNESS:  68-year-old white male presents for follow-up of primary biliary cholangitis currently being managed with ursodeoxycholic acid 1500 mg daily, eosinophilic esophagitis, currently being managed with omeprazole 40 mg daily and history of colonic polyps for which repeat colonoscopy is due in April 2026.  He had previously been on obeticholic acid but this was discontinued.    He presents today without any updated labs.  Most recent labs are from April 2024 which time alkaline phosphatase was mildly elevated 243 which was within his baseline.  AST and ALT have been normal and total bilirubin was mildly elevated at 1.3 mg/dL.  His most recent elastography from April of this year revealed \"low probability of clinically significant fibrosis \".    He reports a little bit of itching but denies dysphagia, odynophagia, early satiety, unexplained weight loss, melena or bright red blood per rectum.  He has been noticing some itching over the last 2 to 3 weeks.    He has history of cholelithiasis.  He has no symptoms of cholelithiasis.    PAST MEDICAL HISTORY  Past Medical History:    Colon polyp    Eosinophilic esophagitis    GERD (gastroesophageal reflux disease)    Hypertension    Primary biliary cirrhosis        PAST SURGICAL HISTORY  Past Surgical History:    CARDIAC SURGERY    COLONOSCOPY    CYSTOSCOPY INSERTION / REMOVAL STENT / STONE    ENDOSCOPY    UPPER GASTROINTESTINAL ENDOSCOPY        MEDICATIONS:    Current Outpatient Medications:     amLODIPine (NORVASC) 2.5 MG tablet, Take 1 tablet by mouth Daily., Disp: , Rfl:     aspirin 81 MG EC tablet, Take 1 tablet by mouth Daily., Disp: , Rfl:     atorvastatin (LIPITOR) 40 MG tablet, Take 1 tablet by mouth Daily., Disp: , Rfl:     Breztri Aerosphere 160-9-4.8 MCG/ACT " "aerosol inhaler, Inhale 2 puffs 2 (Two) Times a Day., Disp: , Rfl:     isosorbide mononitrate (IMDUR) 30 MG 24 hr tablet, Take 1 tablet by mouth Daily., Disp: , Rfl:     levocetirizine (XYZAL) 5 MG tablet, Take 1 tablet by mouth Daily., Disp: , Rfl:     metoprolol succinate XL (TOPROL-XL) 25 MG 24 hr tablet, Take 1 tablet by mouth Daily., Disp: , Rfl:     omeprazole (priLOSEC) 40 MG capsule, Take 1 capsule by mouth Daily., Disp: , Rfl: 0    ursodiol (ACTIGALL) 500 MG tablet, Take 3 tablets by mouth Daily., Disp: 90 tablet, Rfl: 11    albuterol sulfate  (90 Base) MCG/ACT inhaler, Inhale 2 puffs Every 4 (Four) Hours As Needed., Disp: , Rfl:     lisinopril (PRINIVIL,ZESTRIL) 20 MG tablet, Take 1 tablet by mouth Daily., Disp: , Rfl: 0    ALLERGIES  is allergic to levaquin [levofloxacin].    FAMILY HISTORY:  Cancer-related family history is negative for Colon cancer.  Colon Cancer-related family history is negative for Colon polyps and Colon cancer.    SOCIAL HISTORY  He  reports that he has never smoked. He has never used smokeless tobacco. He reports current alcohol use. He reports that he does not currently use drugs after having used the following drugs: Marijuana.    with 5 children.  1 .  Non-smoker.  Rarely uses marijuana does not abuse alcohol.    PHYSICAL EXAM   /70 (BP Location: Left arm, Patient Position: Sitting, Cuff Size: Large Adult)   Ht 182.9 cm (72\")   Wt 95.1 kg (209 lb 9.6 oz)   BMI 28.43 kg/m²   General: Alert and oriented x 3. In no apparent or acute distress.  and No stigmata of chronic liver disease  HEENT: Anicteric sclerae. Normal oropharynx  Neck: Supple. Without lymphadenopathy  CV: Regular rate and rhythm, S1, S2  Lungs: Clear to ausculation. Without rales, rhonchi and wheezing  Abdomen:  Soft,non-distended without palpable masses or hepatosplenomeagaly, areas of rebound tenderness or guarding.   Extremeties: without clubbing, cyanosis or edema  Neurologic:  " Alert and oriented x 3 without focal motor or sensory deficits  Rectal exam: deferred       ASSESSMENT  1.-History of adenomatous colon polyps on colonoscopy by Dr. Sanjiv Love on April 23, 2024.  Repeat colonoscopy recommended in 2 years  2.-Primary biliary cholangitis and ursodeoxycholic acid.  Recheck liver enzymes  3.-History of eosinophilic esophagitis.  Currently asymptomatic without dysphagia to solids  4.-No evidence of esophageal varices on August 2022 EGD.    PLAN  1.-Continue ursodeoxycholic acid 1500 mg/day  2.-Recheck labs  3.-Consider repeat EGD to exclude esophageal varices  4.-Surveillance colonoscopy due April 2026  5.-Consider initiation of carvedilol.      Portillo Matos MD  8/27/2024   13:51 EDT      Addendum.  Labs of April 27, 2024 reviewed  Total bilirubin is elevated to 1.7 which is concerning for progressive disease.  AST is up to 44 and alkaline phosphatase down to 227 down from 243 in April.  CBC shows a white count of 6.91, hemoglobin 16.9, platelet count down to 159,000    Repeat EGD and starting carvedilol as well as consider resumption of obeticholic acid.  Will discuss with patient

## 2024-10-02 ENCOUNTER — HOSPITAL ENCOUNTER (OUTPATIENT)
Dept: ULTRASOUND IMAGING | Facility: HOSPITAL | Age: 69
Discharge: HOME | End: 2024-10-02
Admitting: INTERNAL MEDICINE
Payer: MEDICARE

## 2024-10-02 DIAGNOSIS — K74.3 PRIMARY BILIARY CIRRHOSIS: ICD-10-CM

## 2024-10-02 PROCEDURE — 76705 ECHO EXAM OF ABDOMEN: CPT

## 2025-06-05 ENCOUNTER — TELEPHONE (OUTPATIENT)
Dept: GASTROENTEROLOGY | Facility: CLINIC | Age: 70
End: 2025-06-05

## 2025-06-05 NOTE — TELEPHONE ENCOUNTER
Caller: NORBERT FRANKS    Relationship: SELF    Best call back number: [phone]    Requested Prescriptions:   ursodiol (ACTIGALL) 500 MG tablet     Pharmacy where request should be sent:    [facility]    Last office visit with prescribing clinician: 8/27/2024   Last telemedicine visit with prescribing clinician: Visit date not found   Next office visit with prescribing clinician: 9/2/2025     Additional details provided by patient: HAS ABOUT A WEEK'S WORTH LEFT    Does the patient have less than a 3 day supply:  [] Yes  [x] No    Would you like a call back once the refill request has been completed: [x] Yes [] No    If the office needs to give you a call back, can they leave a voicemail: [x] Yes [] No    Carlton Caraballo   06/05/25 16:26 EDT

## 2025-06-06 RX ORDER — URSODIOL 500 MG/1
1500 TABLET, FILM COATED ORAL DAILY
Qty: 90 TABLET | Refills: 11 | Status: SHIPPED | OUTPATIENT
Start: 2025-06-06